# Patient Record
Sex: FEMALE | Race: WHITE | NOT HISPANIC OR LATINO | Employment: OTHER | URBAN - METROPOLITAN AREA
[De-identification: names, ages, dates, MRNs, and addresses within clinical notes are randomized per-mention and may not be internally consistent; named-entity substitution may affect disease eponyms.]

---

## 2017-11-08 ENCOUNTER — OFFICE VISIT (OUTPATIENT)
Dept: OBSTETRICS AND GYNECOLOGY | Facility: CLINIC | Age: 62
End: 2017-11-08
Payer: COMMERCIAL

## 2017-11-08 VITALS
DIASTOLIC BLOOD PRESSURE: 66 MMHG | BODY MASS INDEX: 24.06 KG/M2 | HEIGHT: 67 IN | WEIGHT: 153.31 LBS | SYSTOLIC BLOOD PRESSURE: 120 MMHG

## 2017-11-08 DIAGNOSIS — N89.8 VAGINAL DRYNESS: ICD-10-CM

## 2017-11-08 DIAGNOSIS — Z12.11 SCREEN FOR COLON CANCER: ICD-10-CM

## 2017-11-08 DIAGNOSIS — G47.09 OTHER INSOMNIA: Primary | ICD-10-CM

## 2017-11-08 DIAGNOSIS — Z01.411 ENCOUNTER FOR GYNECOLOGICAL EXAMINATION (GENERAL) (ROUTINE) WITH ABNORMAL FINDINGS: ICD-10-CM

## 2017-11-08 DIAGNOSIS — Z78.0 MENOPAUSE: ICD-10-CM

## 2017-11-08 DIAGNOSIS — M85.80 OSTEOPENIA, UNSPECIFIED LOCATION: ICD-10-CM

## 2017-11-08 DIAGNOSIS — Z11.51 SPECIAL SCREENING EXAMINATION FOR HUMAN PAPILLOMAVIRUS (HPV): ICD-10-CM

## 2017-11-08 DIAGNOSIS — Z12.4 SCREENING FOR CERVICAL CANCER: ICD-10-CM

## 2017-11-08 DIAGNOSIS — Z12.31 VISIT FOR SCREENING MAMMOGRAM: ICD-10-CM

## 2017-11-08 PROCEDURE — 99999 PR PBB SHADOW E&M-EST. PATIENT-LVL IV: CPT | Mod: PBBFAC,,, | Performed by: OBSTETRICS & GYNECOLOGY

## 2017-11-08 PROCEDURE — 99396 PREV VISIT EST AGE 40-64: CPT | Mod: S$GLB,,, | Performed by: OBSTETRICS & GYNECOLOGY

## 2017-11-08 PROCEDURE — 88175 CYTOPATH C/V AUTO FLUID REDO: CPT

## 2017-11-08 PROCEDURE — 87624 HPV HI-RISK TYP POOLED RSLT: CPT

## 2017-11-08 RX ORDER — IBANDRONATE SODIUM 150 MG/1
150 TABLET, FILM COATED ORAL
Qty: 1 TABLET | Refills: 11 | Status: SHIPPED | OUTPATIENT
Start: 2017-11-08 | End: 2018-09-06 | Stop reason: SDUPTHER

## 2017-11-08 RX ORDER — DICYCLOMINE HYDROCHLORIDE 10 MG/1
10 CAPSULE ORAL
COMMUNITY
End: 2017-12-04 | Stop reason: SDUPTHER

## 2017-11-08 RX ORDER — PANTOPRAZOLE SODIUM 40 MG/1
40 TABLET, DELAYED RELEASE ORAL DAILY
COMMUNITY
End: 2017-12-04 | Stop reason: SDUPTHER

## 2017-11-08 RX ORDER — CELECOXIB 200 MG/1
200 CAPSULE ORAL 2 TIMES DAILY
COMMUNITY
End: 2017-12-04 | Stop reason: SDUPTHER

## 2017-11-08 NOTE — PROGRESS NOTES
Subjective:       Patient ID: Amira Araujo is a 62 y.o. female.    Chief Complaint:  Well Woman (last pap and HPV negative 14, last mammogram needed additional imaging breats u/s wnl 16, last DEXA osteopenia- mild T-score Hip: -1.37, Spine: 1.36 14, last colonoscopy wnl , pt c/o insomnia and also states that she has 3 lumps (back of arm and on leg))      History of Present Illness.  Amira Araujo is a 62 y.o. female.  She has no breast or urinary symptoms.  She has no postcoital bleeding, pelvic pain or vaginal discharge.    GYN & OB History  No LMP recorded. Patient is postmenopausal.   Pap:  Normal HPV negative  Mammogram: 16 abnormal additional imaging normal  Colonoscopy: Normal  DEXA:  Mild osteopenia    OB History    Para Term  AB Living   4 2 2   1 3   SAB TAB Ectopic Multiple Live Births   1       3      # Outcome Date GA Lbr Feliz/2nd Weight Sex Delivery Anes PTL Lv   4 Term 83   3.289 kg (7 lb 4 oz) M Vag-Spont   NII   3  79   3.175 kg (7 lb) F Vag-Spont   NII   2 Term 76   3.062 kg (6 lb 12 oz) F Vag-Spont   NII   1 SAB               Obstetric Comments   Age at menarche 10       Past Medical History:   Diagnosis Date    Breast disorder     Ductal carcinoma in situ (DCIS) of right breast 10/2015    - lumpectomy 2015    History of bone density study 2014    osteopenia- mild T-score Hip: -1.37, Spine: 1.36    IBS (irritable bowel syndrome)     Menopause     Osteopenia 2014    Mild      Past Surgical History:   Procedure Laterality Date    BREAST LUMPECTOMY Left 2015    BROW LIFT AND BLEPHAROPLASTY Bilateral 2013    COLONOSCOPY      normal    TUBAL LIGATION  1985    WISDOM TOOTH EXTRACTION       Family History   Problem Relation Age of Onset    Lung cancer Father     Prostate cancer Father 73    Heart attack Father 53    Coronary artery disease Father     Hypertension Father      "Hyperlipidemia Father     Stroke Mother 75    Breast cancer Sister     Colon cancer Neg Hx     Ovarian cancer Neg Hx     Diabetes Neg Hx      Social History   Substance Use Topics    Smoking status: Never Smoker    Smokeless tobacco: Never Used    Alcohol use Yes      Comment: socially        Current Outpatient Prescriptions:     celecoxib (CELEBREX) 200 MG capsule, Take 200 mg by mouth 2 (two) times daily., Disp: , Rfl:     dicyclomine (BENTYL) 10 MG capsule, Take 10 mg by mouth 4 (four) times daily before meals and nightly., Disp: , Rfl:     gabapentin (NEURONTIN) 300 MG capsule, once a day, Disp: , Rfl:     ibandronate (BONIVA) 150 mg tablet, Take 1 tablet (150 mg total) by mouth every 30 days., Disp: 1 tablet, Rfl: 11    pantoprazole (PROTONIX) 40 MG tablet, Take 40 mg by mouth once daily., Disp: , Rfl:     prasterone, dhea, (INTRAROSA) 6.5 mg Inst, Place 6.5 mg vaginally once daily., Disp: 1 each, Rfl: 11    Review of patient's allergies indicates:  No Known Allergies    Review of Systems  Review of Systems   Constitutional: Negative for fatigue.   HENT: Negative for trouble swallowing.    Eyes: Negative for visual disturbance.   Respiratory: Negative for cough and shortness of breath.    Cardiovascular: Negative for chest pain.   Gastrointestinal: Negative for abdominal distention, abdominal pain, blood in stool, nausea and vomiting.   Genitourinary: Negative for difficulty urinating, dyspareunia, dysuria, flank pain, frequency, hematuria, pelvic pain, urgency, vaginal bleeding, vaginal discharge and vaginal pain.   Musculoskeletal: Negative for arthralgias.   Skin: Negative for rash.   Neurological: Negative for dizziness and headaches.   Psychiatric/Behavioral: Negative for sleep disturbance. The patient is not nervous/anxious.         Objective:     Vitals:    11/08/17 1115   BP: 120/66   Weight: 69.6 kg (153 lb 5.3 oz)   Height: 5' 7" (1.702 m)   PainSc: 0-No pain     Body mass index is 24.01 " kg/m².    Physical Exam:   Constitutional: She is oriented to person, place, and time. Vital signs are normal. She appears well-developed and well-nourished.    HENT:   Head: Normocephalic.     Neck: Normal range of motion. No thyromegaly present.     Pulmonary/Chest: Right breast exhibits no mass, no nipple discharge, no skin change, no tenderness and no swelling. Left breast exhibits no mass, no nipple discharge, no skin change, no tenderness and no swelling. Breasts are symmetrical.        Abdominal: Soft. Normal appearance and bowel sounds are normal. She exhibits no distension. There is no tenderness.     Genitourinary: Rectum normal, vagina normal and uterus normal. Rectal exam shows guaiac negative stool. Guaiac negative stool. Pelvic exam was performed with patient supine. There is no rash, tenderness, lesion or injury on the right labia. There is no rash, tenderness, lesion or injury on the left labia. Cervix is normal. Right adnexum displays no mass, no tenderness and no fullness. Left adnexum displays no mass, no tenderness and no fullness. No erythema in the vagina. No vaginal discharge found. Cervix exhibits no motion tenderness and no discharge.           Musculoskeletal: Normal range of motion.      Lymphadenopathy:        Right: No inguinal and no supraclavicular adenopathy present.        Left: No inguinal and no supraclavicular adenopathy present.    Neurological: She is alert and oriented to person, place, and time.    Skin: Skin is warm and dry.    Psychiatric: She has a normal mood and affect.        Assessment/ Plan:     Other insomnia    Osteopenia, unspecified location  -     ibandronate (BONIVA) 150 mg tablet; Take 1 tablet (150 mg total) by mouth every 30 days.  Dispense: 1 tablet; Refill: 11    Encounter for gynecological examination (general) (routine) with abnormal findings    Screen for colon cancer    Menopause  -     DXA Bone Density Spine And Hip; Future; Expected date:  11/08/2017    Visit for screening mammogram  -     Mammo Digital Screening Bilat with Tomosynthesis CAD; Future; Expected date: 11/08/2017    Screening for cervical cancer  -     Liquid-based pap smear, screening    Special screening examination for human papillomavirus (HPV)  -     HPV High Risk Genotypes, PCR    Vaginal dryness  -     prasterone, dhea, (INTRAROSA) 6.5 mg Inst; Place 6.5 mg vaginally once daily.  Dispense: 1 each; Refill: 11      Melatonin 3 mg QHS  Routine pap smears.  Self breast exam and mammography discussed  Routine colonoscopy discussed.  Diet and exercise discussed.  Recommend calcium 1200 mg and vitamin D 600 units daily and routine bone mineral density testing.  Yearly influenza vaccination discussed.  Follow-up with me in prn

## 2017-11-13 LAB
HPV16 AG SPEC QL: NEGATIVE
HPV16+18+H RISK 12 DNA CVX-IMP: NEGATIVE
HPV18 DNA SPEC QL NAA+PROBE: NEGATIVE

## 2017-11-14 ENCOUNTER — TELEPHONE (OUTPATIENT)
Dept: OBSTETRICS AND GYNECOLOGY | Facility: CLINIC | Age: 62
End: 2017-11-14

## 2017-11-14 NOTE — TELEPHONE ENCOUNTER
Informed pt of normal results and recommended to return in a year for next annual exam. Pt verbalized understanding.

## 2017-11-14 NOTE — TELEPHONE ENCOUNTER
----- Message from Tatyana Rodriguez MD sent at 11/14/2017 11:48 AM CST -----  Call patient and tell her that her Pap smear is normal and I will see her in 1 year for her annual exam.

## 2017-12-04 ENCOUNTER — OFFICE VISIT (OUTPATIENT)
Dept: INTERNAL MEDICINE | Facility: CLINIC | Age: 62
End: 2017-12-04
Payer: COMMERCIAL

## 2017-12-04 VITALS
HEIGHT: 67 IN | HEART RATE: 70 BPM | WEIGHT: 154.56 LBS | SYSTOLIC BLOOD PRESSURE: 99 MMHG | BODY MASS INDEX: 24.26 KG/M2 | DIASTOLIC BLOOD PRESSURE: 62 MMHG | TEMPERATURE: 99 F

## 2017-12-04 DIAGNOSIS — Z00.00 ANNUAL PHYSICAL EXAM: Primary | ICD-10-CM

## 2017-12-04 DIAGNOSIS — K21.9 GASTROESOPHAGEAL REFLUX DISEASE WITHOUT ESOPHAGITIS: ICD-10-CM

## 2017-12-04 DIAGNOSIS — G89.29 CHRONIC MIDLINE LOW BACK PAIN WITHOUT SCIATICA: ICD-10-CM

## 2017-12-04 DIAGNOSIS — M54.50 CHRONIC MIDLINE LOW BACK PAIN WITHOUT SCIATICA: ICD-10-CM

## 2017-12-04 DIAGNOSIS — K58.9 IRRITABLE BOWEL SYNDROME, UNSPECIFIED TYPE: ICD-10-CM

## 2017-12-04 PROCEDURE — 99999 PR PBB SHADOW E&M-EST. PATIENT-LVL III: CPT | Mod: PBBFAC,,, | Performed by: INTERNAL MEDICINE

## 2017-12-04 PROCEDURE — 99386 PREV VISIT NEW AGE 40-64: CPT | Mod: S$GLB,,, | Performed by: INTERNAL MEDICINE

## 2017-12-04 RX ORDER — CELECOXIB 200 MG/1
200 CAPSULE ORAL 2 TIMES DAILY PRN
Qty: 60 CAPSULE | Refills: 2 | Status: SHIPPED | OUTPATIENT
Start: 2017-12-04 | End: 2018-09-06 | Stop reason: SDUPTHER

## 2017-12-04 RX ORDER — GABAPENTIN 300 MG/1
300 CAPSULE ORAL 3 TIMES DAILY
Qty: 90 CAPSULE | Refills: 11 | Status: SHIPPED | OUTPATIENT
Start: 2017-12-04 | End: 2018-09-06 | Stop reason: SDUPTHER

## 2017-12-04 RX ORDER — PANTOPRAZOLE SODIUM 40 MG/1
40 TABLET, DELAYED RELEASE ORAL DAILY
Qty: 30 TABLET | Refills: 11 | Status: SHIPPED | OUTPATIENT
Start: 2017-12-04 | End: 2020-03-05

## 2017-12-04 RX ORDER — DICYCLOMINE HYDROCHLORIDE 10 MG/1
10 CAPSULE ORAL
Qty: 90 CAPSULE | Refills: 6 | Status: SHIPPED | OUTPATIENT
Start: 2017-12-04 | End: 2018-09-06 | Stop reason: SDUPTHER

## 2017-12-04 NOTE — PROGRESS NOTES
Subjective:       Patient ID: Amira Araujo is a 62 y.o. female.    Chief Complaint: Annual Exam and Establish Care    HPI    62 y.o. female here for health maintenance exam and follow up of chronic medical conditions: osteopenia, herniated lumbar disc, GERD, IBS     Lipid disorders/ASCVD risk (ages >/= 45 or >/= 20 if increased risk ): due    DM (>45y yearly or if obese, HTN): A1c due  Hepatitis C (one time if born between 8066-2736): due   Sexual Screening: no concerns  STD screening: no concerns  Eye exam: utd 11/2017  Breast Cancer (40-50y discretion of pt, 50-74y every 1-2 years): Mammogram 11/30/2017  performed at [facility] ([facility])  Cervical Cancer (Pap Smear ages 21-65 every 3 years or Pap + HPV q5 years after 30 years of age):  11/8/2017 - normal  Colorectal Cancer (normal risk 50-75yr): Colonoscopy - Dr. Horn  - 2014- normal  DEXA (F>64 yo, M >69yo, M&F 50-68 yo with risk factors (smoking, previous fx, wt <70kg; etoh abuse, chronic steroids, RA)): 11/30/2017 [facility]        Vaccines:   Influenza (yearly) utd   Tetanus (every 10 yrs - 1st tdap) 3 years ago    PPSV23(>66yo or <65 w/ lung dz, smoking, DM) Unsure which pneumonia shot- 2 years ago - she will find out.  Zoster (>61yo) utd 6 years ago     Exercise: [facility] 4x/wk - bike, walking, weight  Diet: in general healthy     Ortho- Dr. Ector Oropeza w/ [facility] for herniated lumbar disc- takes gabapentin and celebrex if acting up- mainly if playing w/ or lifting grandchildren.  Cardiologist - Dr. Reyes -abnl EKG- negative w/u told everything was normal.  IBS and GERD - GI- Dr. Jose Zhao. IBS- takes bentyl before meal if she is going out to eat.   Problem sleeping- gyn told her to try melatonin- she lost paper so hasn't tried yet.  Osteopenia- taking boniva 150mg q30 days- gynecologist recently ordered another DEXA- results pending  Past Medical History:   Diagnosis Date    Breast disorder      Ductal carcinoma in situ (DCIS) of right breast 10/2015    - lumpectomy 11/2015    History of bone density study 08/20/2014    osteopenia- mild T-score Hip: -1.37, Spine: 1.36    IBS (irritable bowel syndrome)     Menopause 2001    Osteopenia 08/20/2014    Mild      Past Surgical History:   Procedure Laterality Date    BREAST LUMPECTOMY Left 11/2015    BROW LIFT AND BLEPHAROPLASTY Bilateral 2013    COLONOSCOPY  2014    normal    TUBAL LIGATION  07/04/1985    WISDOM TOOTH EXTRACTION       Family History   Problem Relation Age of Onset    Lung cancer Father     Prostate cancer Father 73    Heart attack Father 53    Coronary artery disease Father     Hypertension Father     Hyperlipidemia Father     Stroke Mother 75    Breast cancer Sister     Colon cancer Neg Hx     Ovarian cancer Neg Hx     Diabetes Neg Hx      Social History     Social History    Marital status:      Spouse name: N/A    Number of children: N/A    Years of education: N/A     Occupational History    Not on file.     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      Review of patient's allergies indicates:  No Known Allergies    Current Outpatient Prescriptions:     celecoxib (CELEBREX) 200 MG capsule, Take 1 capsule (200 mg total) by mouth 2 (two) times daily as needed for Pain. Take with food, Disp: 60 capsule, Rfl: 2    gabapentin (NEURONTIN) 300 MG capsule, Take 1 capsule (300 mg total) by mouth 3 (three) times daily., Disp: 90 capsule, Rfl: 11    ibandronate (BONIVA) 150 mg tablet, Take 1 tablet (150 mg total) by mouth every 30 days., Disp: 1  "tablet, Rfl: 11    pantoprazole (PROTONIX) 40 MG tablet, Take 1 tablet (40 mg total) by mouth once daily., Disp: 30 tablet, Rfl: 11    prasterone, dhea, (INTRAROSA) 6.5 mg Inst, Place 6.5 mg vaginally once daily., Disp: 1 each, Rfl: 11    dicyclomine (BENTYL) 10 MG capsule, Take 1 capsule (10 mg total) by mouth before meals as needed., Disp: 90 capsule, Rfl: 6        Review of Systems   Constitutional: Positive for fatigue. Negative for unexpected weight change.   HENT: Negative for dental problem, sinus pain and trouble swallowing.    Eyes: Negative for discharge and redness.   Respiratory: Negative for cough and shortness of breath.    Cardiovascular: Negative for chest pain and leg swelling.   Gastrointestinal: Positive for abdominal pain. Negative for blood in stool, constipation and diarrhea.   Genitourinary: Negative for difficulty urinating, dysuria and frequency.   Musculoskeletal: Positive for back pain. Negative for gait problem and joint swelling.   Skin: Negative for pallor, rash and wound.   Neurological: Negative for numbness and headaches.   Psychiatric/Behavioral: Positive for sleep disturbance.       Objective:        Vitals:    12/04/17 1307   BP: 99/62   BP Location: Right arm   Patient Position: Sitting   BP Method: Medium (Automatic)   Pulse: 70   Temp: 98.6 °F (37 °C)   TempSrc: Oral   Weight: 70.1 kg (154 lb 8.7 oz)   Height: 5' 7" (1.702 m)       Body mass index is 24.2 kg/m².    Physical Exam   Constitutional: She is oriented to person, place, and time. She appears well-developed. No distress.   HENT:   Head: Normocephalic and atraumatic.   Right Ear: Tympanic membrane normal.   Left Ear: Tympanic membrane normal.   Nose: Nose normal.   Mouth/Throat: Oropharynx is clear and moist.   Eyes: Conjunctivae and EOM are normal. Pupils are equal, round, and reactive to light.   Neck: Normal range of motion. Neck supple. No tracheal deviation present. No thyromegaly present.   Cardiovascular: " Normal rate, regular rhythm, normal heart sounds and intact distal pulses.    Pulmonary/Chest: Effort normal and breath sounds normal. No respiratory distress.   Abdominal: Soft. Bowel sounds are normal. She exhibits no distension and no mass. There is no tenderness.   Musculoskeletal: Normal range of motion. She exhibits no edema.   Lymphadenopathy:     She has no cervical adenopathy.   Neurological: She is alert and oriented to person, place, and time. No sensory deficit.   Skin: Skin is warm and dry. She is not diaphoretic. No cyanosis. Nails show no clubbing.   Psychiatric: She has a normal mood and affect. Her behavior is normal. Judgment normal.       Assessment:     1. Annual physical exam    2. Chronic midline low back pain without sciatica    3. Gastroesophageal reflux disease without esophagitis    4. Irritable bowel syndrome, unspecified type           Plan:         1. Annual physical exam  - utd on immunizations. Will let us know which pneumonia vaccine she received  - will request records of colonoscopy  - will request mammo and BMD from [facility] results be faxed to me  - CBC auto differential; Future  - Comprehensive metabolic panel; Future  - Urinalysis; Future  - TSH; Future  - Lipid panel; Future  - Hemoglobin A1c; Future  - Hepatitis C antibody; Future  - Vitamin D; Future      2. Chronic midline low back pain without sciatica  - celecoxib (CELEBREX) 200 MG capsule; Take 1 capsule (200 mg total) by mouth 2 (two) times daily as needed for Pain. Take with food  Dispense: 60 capsule; Refill: 2  - gabapentin (NEURONTIN) 300 MG capsule; Take 1 capsule (300 mg total) by mouth 3 (three) times daily.  Dispense: 90 capsule; Refill: 11    3. Gastroesophageal reflux disease without esophagitis  - pantoprazole (PROTONIX) 40 MG tablet; Take 1 tablet (40 mg total) by mouth once daily.  Dispense: 30 tablet; Refill: 11    4. Irritable bowel syndrome, unspecified type  - dicyclomine (BENTYL) 10 MG capsule; Take 1  capsule (10 mg total) by mouth before meals as needed.  Dispense: 90 capsule; Refill: 6

## 2017-12-06 ENCOUNTER — PATIENT MESSAGE (OUTPATIENT)
Dept: INTERNAL MEDICINE | Facility: CLINIC | Age: 62
End: 2017-12-06

## 2017-12-06 ENCOUNTER — LAB VISIT (OUTPATIENT)
Dept: LAB | Facility: HOSPITAL | Age: 62
End: 2017-12-06
Attending: INTERNAL MEDICINE
Payer: COMMERCIAL

## 2017-12-06 DIAGNOSIS — Z00.00 ANNUAL PHYSICAL EXAM: ICD-10-CM

## 2017-12-06 LAB
25(OH)D3+25(OH)D2 SERPL-MCNC: 27 NG/ML
ALBUMIN SERPL BCP-MCNC: 3.6 G/DL
ALP SERPL-CCNC: 53 U/L
ALT SERPL W/O P-5'-P-CCNC: 23 U/L
ANION GAP SERPL CALC-SCNC: 5 MMOL/L
AST SERPL-CCNC: 18 U/L
BASOPHILS # BLD AUTO: 0.05 K/UL
BASOPHILS NFR BLD: 1 %
BILIRUB SERPL-MCNC: 0.6 MG/DL
BUN SERPL-MCNC: 18 MG/DL
CALCIUM SERPL-MCNC: 9.2 MG/DL
CHLORIDE SERPL-SCNC: 107 MMOL/L
CHOLEST SERPL-MCNC: 192 MG/DL
CHOLEST/HDLC SERPL: 2.9 {RATIO}
CO2 SERPL-SCNC: 29 MMOL/L
CREAT SERPL-MCNC: 0.7 MG/DL
DIFFERENTIAL METHOD: ABNORMAL
EOSINOPHIL # BLD AUTO: 0.1 K/UL
EOSINOPHIL NFR BLD: 1.9 %
ERYTHROCYTE [DISTWIDTH] IN BLOOD BY AUTOMATED COUNT: 12.5 %
EST. GFR  (AFRICAN AMERICAN): >60 ML/MIN/1.73 M^2
EST. GFR  (NON AFRICAN AMERICAN): >60 ML/MIN/1.73 M^2
ESTIMATED AVG GLUCOSE: 103 MG/DL
GLUCOSE SERPL-MCNC: 91 MG/DL
HBA1C MFR BLD HPLC: 5.2 %
HCT VFR BLD AUTO: 41.6 %
HCV AB SERPL QL IA: NEGATIVE
HDLC SERPL-MCNC: 67 MG/DL
HDLC SERPL: 34.9 %
HGB BLD-MCNC: 13.2 G/DL
IMM GRANULOCYTES # BLD AUTO: 0.02 K/UL
IMM GRANULOCYTES NFR BLD AUTO: 0.4 %
LDLC SERPL CALC-MCNC: 110.8 MG/DL
LYMPHOCYTES # BLD AUTO: 2 K/UL
LYMPHOCYTES NFR BLD: 38.6 %
MCH RBC QN AUTO: 29.7 PG
MCHC RBC AUTO-ENTMCNC: 31.7 G/DL
MCV RBC AUTO: 94 FL
MONOCYTES # BLD AUTO: 0.4 K/UL
MONOCYTES NFR BLD: 8 %
NEUTROPHILS # BLD AUTO: 2.6 K/UL
NEUTROPHILS NFR BLD: 50.1 %
NONHDLC SERPL-MCNC: 125 MG/DL
NRBC BLD-RTO: 0 /100 WBC
PLATELET # BLD AUTO: 278 K/UL
PMV BLD AUTO: 10.5 FL
POTASSIUM SERPL-SCNC: 4 MMOL/L
PROT SERPL-MCNC: 6.8 G/DL
RBC # BLD AUTO: 4.44 M/UL
SODIUM SERPL-SCNC: 141 MMOL/L
TRIGL SERPL-MCNC: 71 MG/DL
TSH SERPL DL<=0.005 MIU/L-ACNC: 0.89 UIU/ML
WBC # BLD AUTO: 5.26 K/UL

## 2017-12-06 PROCEDURE — 80053 COMPREHEN METABOLIC PANEL: CPT

## 2017-12-06 PROCEDURE — 82306 VITAMIN D 25 HYDROXY: CPT

## 2017-12-06 PROCEDURE — 84443 ASSAY THYROID STIM HORMONE: CPT

## 2017-12-06 PROCEDURE — 85025 COMPLETE CBC W/AUTO DIFF WBC: CPT

## 2017-12-06 PROCEDURE — 36415 COLL VENOUS BLD VENIPUNCTURE: CPT | Mod: PO

## 2017-12-06 PROCEDURE — 83036 HEMOGLOBIN GLYCOSYLATED A1C: CPT

## 2017-12-06 PROCEDURE — 80061 LIPID PANEL: CPT

## 2017-12-06 PROCEDURE — 86803 HEPATITIS C AB TEST: CPT

## 2017-12-08 ENCOUNTER — TELEPHONE (OUTPATIENT)
Dept: OBSTETRICS AND GYNECOLOGY | Facility: CLINIC | Age: 62
End: 2017-12-08

## 2018-06-13 ENCOUNTER — OFFICE VISIT (OUTPATIENT)
Dept: DERMATOLOGY | Facility: CLINIC | Age: 63
End: 2018-06-13
Payer: MEDICARE

## 2018-06-13 DIAGNOSIS — L81.4 LENTIGO: Primary | ICD-10-CM

## 2018-06-13 DIAGNOSIS — L82.1 SK (SEBORRHEIC KERATOSIS): ICD-10-CM

## 2018-06-13 DIAGNOSIS — D18.00 ANGIOMA: ICD-10-CM

## 2018-06-13 DIAGNOSIS — D22.9 NEVUS: ICD-10-CM

## 2018-06-13 PROCEDURE — 99203 OFFICE O/P NEW LOW 30 MIN: CPT | Mod: S$PBB,,, | Performed by: DERMATOLOGY

## 2018-06-13 PROCEDURE — 99212 OFFICE O/P EST SF 10 MIN: CPT | Mod: PBBFAC,PO | Performed by: DERMATOLOGY

## 2018-06-13 PROCEDURE — 99999 PR PBB SHADOW E&M-EST. PATIENT-LVL II: CPT | Mod: PBBFAC,,, | Performed by: DERMATOLOGY

## 2018-06-13 RX ORDER — HYDROQUINONE 40 MG/G
CREAM TOPICAL
Qty: 46 G | Refills: 1 | Status: SHIPPED | OUTPATIENT
Start: 2018-06-13 | End: 2020-03-05

## 2018-06-13 NOTE — PROGRESS NOTES
Subjective:       Patient ID:  Amira Araujo is a 62 y.o. female who presents for   Chief Complaint   Patient presents with    Skin Check    Lesion     HPI    Review of Systems     Objective:    Physical Exam       Diagram Legend     Erythematous scaling macule/papule c/w actinic keratosis       Vascular papule c/w angioma      Pigmented verrucoid papule/plaque c/w seborrheic keratosis      Yellow umbilicated papule c/w sebaceous hyperplasia      Irregularly shaped tan macule c/w lentigo     1-2 mm smooth white papules consistent with Milia      Movable subcutaneous cyst with punctum c/w epidermal inclusion cyst      Subcutaneous movable cyst c/w pilar cyst      Firm pink to brown papule c/w dermatofibroma      Pedunculated fleshy papule(s) c/w skin tag(s)      Evenly pigmented macule c/w junctional nevus     Mildly variegated pigmented, slightly irregular-bordered macule c/w mildly atypical nevus      Flesh colored to evenly pigmented papule c/w intradermal nevus       Pink pearly papule/plaque c/w basal cell carcinoma      Erythematous hyperkeratotic cursted plaque c/w SCC      Surgical scar with no sign of skin cancer recurrence      Open and closed comedones      Inflammatory papules and pustules      Verrucoid papule consistent consistent with wart     Erythematous eczematous patches and plaques     Dystrophic onycholytic nail with subungual debris c/w onychomycosis     Umbilicated papule    Erythematous-base heme-crusted tan verrucoid plaque consistent with inflamed seborrheic keratosis     Erythematous Silvery Scaling Plaque c/w Psoriasis     See annotation      Assessment / Plan:        There are no diagnoses linked to this encounter.         No Follow-up on file.

## 2018-06-13 NOTE — PROGRESS NOTES
"  Subjective:       Patient ID:  Amira Araujo is a 62 y.o. female who presents for   Chief Complaint   Patient presents with    Skin Check    Lesion     New patient presents for TBSE.  No personal history of skin cancer. 1 mole removed from back, benign. No lesions of concern for skin cancer. Reports getting "age spots" on her face.     Patient is here today for a "mole" check.   Pt has a history of  extensive sun exposure in the past.   Pt recalls several blistering sunburns in the past- few   Pt has history of tanning bed use- once  Pt has  had moles removed in the past- one on back   Pt has history of melanoma in first degree relatives-  no          Review of Systems   Skin: Positive for daily sunscreen use and activity-related sunscreen use. Negative for tendency to form keloidal scars.   Hematologic/Lymphatic: Does not bruise/bleed easily.        Objective:    Physical Exam   Constitutional: She appears well-developed and well-nourished. No distress.   Neurological: She is alert and oriented to person, place, and time. She is not disoriented.   Psychiatric: She has a normal mood and affect.   Skin:   Areas Examined (abnormalities noted in diagram):   Scalp / Hair Palpated and Inspected  Head / Face Inspection Performed  Neck Inspection Performed  Chest / Axilla Inspection Performed  Abdomen Inspection Performed  Genitals / Buttocks / Groin Inspection Performed  Back Inspection Performed  RUE Inspected  LUE Inspection Performed  RLE Inspected  LLE Inspection Performed  Nails and Digits Inspection Performed                   Diagram Legend     Erythematous scaling macule/papule c/w actinic keratosis       Vascular papule c/w angioma      Pigmented verrucoid papule/plaque c/w seborrheic keratosis      Yellow umbilicated papule c/w sebaceous hyperplasia      Irregularly shaped tan macule c/w lentigo     1-2 mm smooth white papules consistent with Milia      Movable subcutaneous cyst with punctum c/w " epidermal inclusion cyst      Subcutaneous movable cyst c/w pilar cyst      Firm pink to brown papule c/w dermatofibroma      Pedunculated fleshy papule(s) c/w skin tag(s)      Evenly pigmented macule c/w junctional nevus     Mildly variegated pigmented, slightly irregular-bordered macule c/w mildly atypical nevus      Flesh colored to evenly pigmented papule c/w intradermal nevus       Pink pearly papule/plaque c/w basal cell carcinoma      Erythematous hyperkeratotic cursted plaque c/w SCC      Surgical scar with no sign of skin cancer recurrence      Open and closed comedones      Inflammatory papules and pustules      Verrucoid papule consistent consistent with wart     Erythematous eczematous patches and plaques     Dystrophic onycholytic nail with subungual debris c/w onychomycosis     Umbilicated papule    Erythematous-base heme-crusted tan verrucoid plaque consistent with inflamed seborrheic keratosis     Erythematous Silvery Scaling Plaque c/w Psoriasis     See annotation      Assessment / Plan:        Lentigo  This is a benign hyperpigmented sun induced lesion. Daily sun protection will reduce the number of new lesions. Treatment of these benign lesions are considered cosmetic.  The nature of sun-induced photo-aging and skin cancers is discussed.  Sun avoidance, protective clothing, and the use of 30-SPF sunscreens is advised. Observe closely for skin damage/changes, and call if such occurs.  OTC retinol all over then below on dark areas  -     hydroquinone 4 % Crea; Apply to dark areas qhs.  Not more than 6 months straight in same location.Use sunscreen in am  Dispense: 46 g; Refill: 1    Angioma  These are benign vascular lesions that are inherited.  Treatment is not necessary.    Nevus  Discussed ABCDE's of nevi.  Monitor for new mole or moles that are becoming bigger, darker, irritated, or developing irregular borders. Brochure provided.    SK (seborrheic keratosis)  These are benign inherited growths  without a malignant potential. Reassurance given to patient. No treatment is necessary.     Total body skin examination performed today including at least 12 points as noted in physical examination. No lesions suspicious for malignancy noted.             Follow-up in about 1 year (around 6/13/2019).

## 2018-09-05 ENCOUNTER — OFFICE VISIT (OUTPATIENT)
Dept: DERMATOLOGY | Facility: CLINIC | Age: 63
End: 2018-09-05
Payer: MEDICARE

## 2018-09-05 DIAGNOSIS — L72.0 MILIA: Primary | ICD-10-CM

## 2018-09-05 DIAGNOSIS — L81.9 DYSCHROMIA: ICD-10-CM

## 2018-09-05 PROCEDURE — 99212 OFFICE O/P EST SF 10 MIN: CPT | Mod: PBBFAC,PO | Performed by: DERMATOLOGY

## 2018-09-05 PROCEDURE — 99213 OFFICE O/P EST LOW 20 MIN: CPT | Mod: S$PBB,,, | Performed by: DERMATOLOGY

## 2018-09-05 PROCEDURE — 99999 PR PBB SHADOW E&M-EST. PATIENT-LVL II: CPT | Mod: PBBFAC,,, | Performed by: DERMATOLOGY

## 2018-09-05 RX ORDER — TRETINOIN 0.25 MG/G
CREAM TOPICAL
Qty: 45 G | Refills: 6 | Status: SHIPPED | OUTPATIENT
Start: 2018-09-05 | End: 2020-03-05

## 2018-09-05 NOTE — PROGRESS NOTES
Subjective:       Patient ID:  Amira Araujo is a 63 y.o. female who presents for   Chief Complaint   Patient presents with    Lesion     Py c/o whites bumps on face x 1 year. Gets worst with heat. No prev tx.  Also complains of dark circles under her eyes.  No tx currently.   Using retinol otc .         Review of Systems   Skin: Positive for daily sunscreen use. Negative for tendency to form keloidal scars.   Hematologic/Lymphatic: Does not bruise/bleed easily.        Objective:    Physical Exam   Constitutional: She appears well-developed and well-nourished. No distress.   Neurological: She is alert and oriented to person, place, and time. She is not disoriented.   Psychiatric: She has a normal mood and affect.   Skin:   Areas Examined (abnormalities noted in diagram):   Head / Face Inspection Performed              Diagram Legend     Erythematous scaling macule/papule c/w actinic keratosis       Vascular papule c/w angioma      Pigmented verrucoid papule/plaque c/w seborrheic keratosis      Yellow umbilicated papule c/w sebaceous hyperplasia      Irregularly shaped tan macule c/w lentigo     1-2 mm smooth white papules consistent with Milia      Movable subcutaneous cyst with punctum c/w epidermal inclusion cyst      Subcutaneous movable cyst c/w pilar cyst      Firm pink to brown papule c/w dermatofibroma      Pedunculated fleshy papule(s) c/w skin tag(s)      Evenly pigmented macule c/w junctional nevus     Mildly variegated pigmented, slightly irregular-bordered macule c/w mildly atypical nevus      Flesh colored to evenly pigmented papule c/w intradermal nevus       Pink pearly papule/plaque c/w basal cell carcinoma      Erythematous hyperkeratotic cursted plaque c/w SCC      Surgical scar with no sign of skin cancer recurrence      Open and closed comedones      Inflammatory papules and pustules      Verrucoid papule consistent consistent with wart     Erythematous eczematous patches and plaques      Dystrophic onycholytic nail with subungual debris c/w onychomycosis     Umbilicated papule    Erythematous-base heme-crusted tan verrucoid plaque consistent with inflamed seborrheic keratosis     Erythematous Silvery Scaling Plaque c/w Psoriasis     See annotation      Assessment / Plan:        Milia  -     tretinoin (RETIN-A) 0.025 % cream; Apply thin film to face qhs then moisturize  Dispense: 45 g; Refill: 6 or [facility] retinol  Reassurance    Dyschromia  Teamine bid            Follow-up if symptoms worsen or fail to improve.

## 2018-09-06 ENCOUNTER — LAB VISIT (OUTPATIENT)
Dept: LAB | Facility: HOSPITAL | Age: 63
End: 2018-09-06
Attending: INTERNAL MEDICINE
Payer: MEDICARE

## 2018-09-06 ENCOUNTER — OFFICE VISIT (OUTPATIENT)
Dept: INTERNAL MEDICINE | Facility: CLINIC | Age: 63
End: 2018-09-06
Payer: MEDICARE

## 2018-09-06 VITALS
HEIGHT: 67 IN | TEMPERATURE: 99 F | DIASTOLIC BLOOD PRESSURE: 66 MMHG | SYSTOLIC BLOOD PRESSURE: 90 MMHG | HEART RATE: 80 BPM | WEIGHT: 168 LBS | BODY MASS INDEX: 26.37 KG/M2

## 2018-09-06 DIAGNOSIS — M54.50 CHRONIC MIDLINE LOW BACK PAIN WITHOUT SCIATICA: ICD-10-CM

## 2018-09-06 DIAGNOSIS — M25.561 CHRONIC PAIN OF RIGHT KNEE: ICD-10-CM

## 2018-09-06 DIAGNOSIS — Z23 NEED FOR VACCINATION WITH 13-POLYVALENT PNEUMOCOCCAL CONJUGATE VACCINE: ICD-10-CM

## 2018-09-06 DIAGNOSIS — Z12.31 VISIT FOR SCREENING MAMMOGRAM: ICD-10-CM

## 2018-09-06 DIAGNOSIS — K58.9 IRRITABLE BOWEL SYNDROME, UNSPECIFIED TYPE: ICD-10-CM

## 2018-09-06 DIAGNOSIS — R53.83 FATIGUE, UNSPECIFIED TYPE: ICD-10-CM

## 2018-09-06 DIAGNOSIS — E55.9 VITAMIN D INSUFFICIENCY: ICD-10-CM

## 2018-09-06 DIAGNOSIS — R26.9 GAIT ABNORMALITY: ICD-10-CM

## 2018-09-06 DIAGNOSIS — R26.81 UNSTEADINESS ON FEET: ICD-10-CM

## 2018-09-06 DIAGNOSIS — G89.29 CHRONIC MIDLINE LOW BACK PAIN WITHOUT SCIATICA: ICD-10-CM

## 2018-09-06 DIAGNOSIS — G89.29 CHRONIC PAIN OF RIGHT KNEE: ICD-10-CM

## 2018-09-06 DIAGNOSIS — K21.9 GASTROESOPHAGEAL REFLUX DISEASE WITHOUT ESOPHAGITIS: ICD-10-CM

## 2018-09-06 DIAGNOSIS — Z78.0 ASYMPTOMATIC POSTMENOPAUSAL STATE: ICD-10-CM

## 2018-09-06 DIAGNOSIS — Z23 NEED FOR SHINGLES VACCINE: ICD-10-CM

## 2018-09-06 DIAGNOSIS — M85.80 OSTEOPENIA, UNSPECIFIED LOCATION: ICD-10-CM

## 2018-09-06 DIAGNOSIS — M85.80 OSTEOPENIA, UNSPECIFIED LOCATION: Primary | ICD-10-CM

## 2018-09-06 LAB
25(OH)D3+25(OH)D2 SERPL-MCNC: 28 NG/ML
IRON SERPL-MCNC: 149 UG/DL
SATURATED IRON: 37 %
TOTAL IRON BINDING CAPACITY: 401 UG/DL
TRANSFERRIN SERPL-MCNC: 271 MG/DL
TSH SERPL DL<=0.005 MIU/L-ACNC: 0.6 UIU/ML
VIT B12 SERPL-MCNC: 502 PG/ML

## 2018-09-06 PROCEDURE — 84443 ASSAY THYROID STIM HORMONE: CPT

## 2018-09-06 PROCEDURE — 99215 OFFICE O/P EST HI 40 MIN: CPT | Mod: PBBFAC,PO | Performed by: INTERNAL MEDICINE

## 2018-09-06 PROCEDURE — 83540 ASSAY OF IRON: CPT

## 2018-09-06 PROCEDURE — 82607 VITAMIN B-12: CPT

## 2018-09-06 PROCEDURE — 82306 VITAMIN D 25 HYDROXY: CPT

## 2018-09-06 PROCEDURE — 99215 OFFICE O/P EST HI 40 MIN: CPT | Mod: S$PBB,,, | Performed by: INTERNAL MEDICINE

## 2018-09-06 PROCEDURE — G0009 ADMIN PNEUMOCOCCAL VACCINE: HCPCS | Mod: PBBFAC,PO

## 2018-09-06 PROCEDURE — 99999 PR PBB SHADOW E&M-EST. PATIENT-LVL V: CPT | Mod: PBBFAC,,, | Performed by: INTERNAL MEDICINE

## 2018-09-06 PROCEDURE — 36415 COLL VENOUS BLD VENIPUNCTURE: CPT | Mod: PO

## 2018-09-06 RX ORDER — DICYCLOMINE HYDROCHLORIDE 10 MG/1
10 CAPSULE ORAL
Qty: 90 CAPSULE | Refills: 6 | Status: SHIPPED | OUTPATIENT
Start: 2018-09-06 | End: 2020-03-05 | Stop reason: SDUPTHER

## 2018-09-06 RX ORDER — IBANDRONATE SODIUM 150 MG/1
150 TABLET, FILM COATED ORAL
Qty: 3 TABLET | Refills: 3 | Status: SHIPPED | OUTPATIENT
Start: 2018-09-06 | End: 2018-12-07 | Stop reason: ALTCHOICE

## 2018-09-06 RX ORDER — CELECOXIB 200 MG/1
200 CAPSULE ORAL 2 TIMES DAILY PRN
Qty: 60 CAPSULE | Refills: 2 | Status: SHIPPED | OUTPATIENT
Start: 2018-09-06 | End: 2020-09-29

## 2018-09-06 RX ORDER — GABAPENTIN 300 MG/1
300 CAPSULE ORAL NIGHTLY
Qty: 90 CAPSULE | Refills: 3 | Status: SHIPPED | OUTPATIENT
Start: 2018-09-06 | End: 2021-01-20

## 2018-09-06 NOTE — PROGRESS NOTES
Subjective:       Patient ID: Amira Araujo is a 63 y.o. female.    Chief Complaint: Fatigue    HPI    63 y.o. female here to discuss healthcare maintenance and f/u of chronic medical conditions.      Health Maintenance:   Lipid disorders/ASCVD risk: utd    DM: FBG utd  Hepatitis C (2062-8220): negative   Breast Cancer (40-74y): Mammogram due   Cervical Cancer (21-65y):  declines  Colorectal Cancer (50-75yr): Colonoscopy 2014   Bone Density (F>64 yo, M >71yo): DEXA due      Vaccines:   Influenza (yearly) pt will schedule   Tetanus (every 10 yrs - 1st tdap) 2013    PPSV23(>66yo or <65 w/ lung dz, smoking, DM) next year   PCV13 (> 65 or <65 w/ immunocompromised) due   Zoster (>61yo) due       Medical Problems:  1.  osteopenia:  Needs refills of Boniva.  2.   Back pain:  Needs refills of gabapentin and Celebrex    Fatigue started a few months ago. No known inciting events. She does endorse recent falls- twice in June and once in may. She tripped two times, the other time she was on a bike and didn't take a turn sharp enough around the corner. She thinks her falls are due to walking abnormally (leaning forward) due to back pain. She denies head injury or LOC. She goes to bed around 10-10:30 pm and waking up around 7am. She does report feeling rested when she wakes up. It is more so a fatigue by the end of the day.     Past Medical History:   Diagnosis Date    Breast disorder     Ductal carcinoma in situ (DCIS) of right breast 10/2015    - lumpectomy 11/2015    History of bone density study 08/20/2014    osteopenia- mild T-score Hip: -1.37, Spine: 1.36    IBS (irritable bowel syndrome)     Menopause 2001    Osteopenia 08/20/2014    Mild      Past Surgical History:   Procedure Laterality Date    BREAST LUMPECTOMY Left 11/2015    BROW LIFT AND BLEPHAROPLASTY Bilateral 2013    COLONOSCOPY  2014    5mm sessile polyp in sigmoid colon (pathology - no tissue present); diverticulosis, internal hemorrhoids    TUBAL  LIGATION  07/04/1985    WISDOM TOOTH EXTRACTION       Family History   Problem Relation Age of Onset    Lung cancer Father     Prostate cancer Father 73    Heart attack Father 53    Coronary artery disease Father     Hypertension Father     Hyperlipidemia Father     Stroke Mother 75    Breast cancer Sister     Colon cancer Neg Hx     Ovarian cancer Neg Hx     Diabetes Neg Hx     Melanoma Neg Hx      Social History     Socioeconomic History    Marital status:      Spouse name: Not on file    Number of children: Not on file    Years of education: Not on file    Highest education level: Not on file   Social Needs    Financial resource strain: Not on file    Food insecurity - worry: Not on file    Food insecurity - inability: Not on file    Transportation needs - medical: Not on file    Transportation needs - non-medical: Not on file   Occupational History    Not on file   Tobacco Use    Smoking status: Never Smoker    Smokeless tobacco: Never Used   Substance and Sexual Activity    Alcohol use: Yes     Comment: socially     Drug use: No    Sexual activity: Yes     Partners: Male     Birth control/protection: Post-menopausal     Comment:     Other Topics Concern    Are you pregnant or think you may be? 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      Review of patient's allergies indicates:  No Known Allergies    Current Outpatient Medications:     celecoxib (CELEBREX) 200 MG capsule, Take 1 capsule (200 mg total) by mouth 2 (two) times daily as needed for Pain. Take with food, Disp: 60 capsule, Rfl: 2    dicyclomine (BENTYL) 10 MG capsule, Take 1 capsule (10 mg total) by mouth before meals as needed., Disp: 90 capsule, Rfl: 6    gabapentin (NEURONTIN) 300 MG capsule, Take 1 capsule (300 mg total) by mouth every evening., Disp: 90 capsule, Rfl: 3    hydroquinone 4 % Crea,  "Apply to dark areas qhs.  Not more than 6 months straight in same location.Use sunscreen in am, Disp: 46 g, Rfl: 1    ibandronate (BONIVA) 150 mg tablet, Take 1 tablet (150 mg total) by mouth every 30 days., Disp: 3 tablet, Rfl: 3    pantoprazole (PROTONIX) 40 MG tablet, Take 1 tablet (40 mg total) by mouth once daily., Disp: 30 tablet, Rfl: 11    tretinoin (RETIN-A) 0.025 % cream, Apply thin film to face qhs then moisturize, Disp: 45 g, Rfl: 6    varicella-zoster gE-AS01B, PF, (SHINGRIX, PF,) 50 mcg/0.5 mL injection, Inject 0.5 mLs into the muscle once. for 1 dose, Disp: 0.5 mL, Rfl: 1        Review of Systems   Constitutional: Positive for fatigue. Negative for unexpected weight change.   HENT: Negative for dental problem, sinus pain and trouble swallowing.    Eyes: Negative for discharge and redness.   Respiratory: Negative for cough and shortness of breath.    Cardiovascular: Negative for chest pain and leg swelling.   Gastrointestinal: Negative for abdominal pain, blood in stool, constipation and diarrhea.   Genitourinary: Negative for difficulty urinating and dysuria.   Musculoskeletal: Positive for arthralgias, back pain and gait problem. Negative for joint swelling.   Skin: Negative for pallor, rash and wound.   Neurological: Negative for syncope, numbness and headaches.       Objective:        Vitals:    09/06/18 1031   BP: 90/66   BP Location: Right arm   Patient Position: Sitting   BP Method: Medium (Manual)   Pulse: 80   Temp: 98.5 °F (36.9 °C)   TempSrc: Oral   Weight: 76.2 kg (167 lb 15.9 oz)   Height: 5' 7" (1.702 m)       Body mass index is 26.31 kg/m².    Physical Exam   Constitutional: She is oriented to person, place, and time. She appears well-developed and well-nourished. No distress.   HENT:   Head: Normocephalic and atraumatic.   Right Ear: External ear normal.   Left Ear: External ear normal.   Nose: Nose normal.   Mouth/Throat: Oropharynx is clear and moist.   Eyes: Conjunctivae and EOM " are normal. Right eye exhibits no discharge. Left eye exhibits no discharge.   Neck: Normal range of motion. Neck supple.   Cardiovascular: Normal rate, regular rhythm, normal heart sounds and intact distal pulses.   Pulmonary/Chest: Effort normal and breath sounds normal.   Abdominal: Soft. Bowel sounds are normal.   Musculoskeletal: She exhibits no edema.        Lumbar back: She exhibits decreased range of motion and pain.   Lymphadenopathy:     She has no cervical adenopathy.   Neurological: She is alert and oriented to person, place, and time. She has normal strength. Gait (leaning forward above hips) abnormal.   Skin: Skin is warm and dry. She is not diaphoretic. No erythema.   Psychiatric: She has a normal mood and affect. Her behavior is normal. Thought content normal.       Assessment:     1. Osteopenia, unspecified location    2. Visit for screening mammogram    3. Fatigue, unspecified type    4. Asymptomatic postmenopausal state    5. Need for shingles vaccine    6. Chronic midline low back pain without sciatica    7. Irritable bowel syndrome, unspecified type    8. Vitamin D insufficiency    9. Gastroesophageal reflux disease without esophagitis    10. Chronic pain of right knee     11. Unsteadiness on feet     12. Need for vaccination with 13-polyvalent pneumococcal conjugate vaccine    13. Gait abnormality           Plan:         1. Osteopenia, unspecified location  - ibandronate (BONIVA) 150 mg tablet; Take 1 tablet (150 mg total) by mouth every 30 days.  Dispense: 3 tablet; Refill: 3  - DXA Bone Density Spine And Hip; Future  - TSH; Future  - Vitamin D; Future  - Iron and TIBC; Future  - Vitamin B12; Future    2. Visit for screening mammogram  - Mammo Digital Screening Bilat with CAD; Future    3. Fatigue, unspecified type  - TSH; Future  - Vitamin D; Future  - Iron and TIBC; Future  - Vitamin B12; Future    4. Asymptomatic postmenopausal state  - DXA Bone Density Spine And Hip; Future    5. Need for  shingles vaccine  - varicella-zoster gE-AS01B, PF, (SHINGRIX, PF,) 50 mcg/0.5 mL injection; Inject 0.5 mLs into the muscle once. for 1 dose  Dispense: 0.5 mL; Refill: 1    6. Chronic midline low back pain without sciatica  - celecoxib (CELEBREX) 200 MG capsule; Take 1 capsule (200 mg total) by mouth 2 (two) times daily as needed for Pain. Take with food  Dispense: 60 capsule; Refill: 2  - gabapentin (NEURONTIN) 300 MG capsule; Take 1 capsule (300 mg total) by mouth every evening.  Dispense: 90 capsule; Refill: 3  - Vitamin B12; Future    7. Irritable bowel syndrome, unspecified type  - dicyclomine (BENTYL) 10 MG capsule; Take 1 capsule (10 mg total) by mouth before meals as needed.  Dispense: 90 capsule; Refill: 6  - TSH; Future  - Iron and TIBC; Future  - Vitamin B12; Future    8. Vitamin D insufficiency  - Vitamin D; Future    9. Gastroesophageal reflux disease without esophagitis  - continue pantoprazole  - Iron and TIBC; Future    10. Chronic pain of right knee   - celebrex prn  - Iron and TIBC; Future    11. Unsteadiness on feet   - refer to PT  - seems more so like she tripped over objects rather than unstaediness  - Vitamin B12; Future    12. Need for vaccination with 13-polyvalent pneumococcal conjugate vaccine  - Pneumococcal Conjugate Vaccine (13 Valent) (IM)    13. Gait abnormality  - encouraged her to notify me if no improvement.  - Ambulatory consult to Physical Therapy

## 2018-09-07 ENCOUNTER — PATIENT MESSAGE (OUTPATIENT)
Dept: INTERNAL MEDICINE | Facility: CLINIC | Age: 63
End: 2018-09-07

## 2018-09-07 NOTE — TELEPHONE ENCOUNTER
Please inform patient of test results:   Vitamin D slightly low. I recommend vitamin D3 2000 units otc daily.  Thyroid, vitamin B12 and iron levels are normal.

## 2018-12-03 ENCOUNTER — APPOINTMENT (OUTPATIENT)
Dept: RADIOLOGY | Facility: CLINIC | Age: 63
End: 2018-12-03
Attending: INTERNAL MEDICINE
Payer: MEDICARE

## 2018-12-03 DIAGNOSIS — Z78.0 ASYMPTOMATIC POSTMENOPAUSAL STATE: ICD-10-CM

## 2018-12-03 DIAGNOSIS — M85.80 OSTEOPENIA, UNSPECIFIED LOCATION: ICD-10-CM

## 2018-12-03 PROCEDURE — 77080 DXA BONE DENSITY AXIAL: CPT | Mod: TC,PO

## 2018-12-03 PROCEDURE — 77080 DXA BONE DENSITY AXIAL: CPT | Mod: 26,,, | Performed by: INTERNAL MEDICINE

## 2018-12-05 ENCOUNTER — OFFICE VISIT (OUTPATIENT)
Dept: INTERNAL MEDICINE | Facility: CLINIC | Age: 63
End: 2018-12-05
Payer: MEDICARE

## 2018-12-05 VITALS
SYSTOLIC BLOOD PRESSURE: 108 MMHG | RESPIRATION RATE: 16 BRPM | BODY MASS INDEX: 26.16 KG/M2 | HEART RATE: 78 BPM | DIASTOLIC BLOOD PRESSURE: 69 MMHG | TEMPERATURE: 99 F | HEIGHT: 67 IN | WEIGHT: 166.69 LBS

## 2018-12-05 DIAGNOSIS — J06.9 UPPER RESPIRATORY TRACT INFECTION, UNSPECIFIED TYPE: Primary | ICD-10-CM

## 2018-12-05 PROCEDURE — 99213 OFFICE O/P EST LOW 20 MIN: CPT | Mod: S$PBB,,, | Performed by: INTERNAL MEDICINE

## 2018-12-05 PROCEDURE — 99213 OFFICE O/P EST LOW 20 MIN: CPT | Mod: PBBFAC,PO | Performed by: INTERNAL MEDICINE

## 2018-12-05 PROCEDURE — 99999 PR PBB SHADOW E&M-EST. PATIENT-LVL III: CPT | Mod: PBBFAC,,, | Performed by: INTERNAL MEDICINE

## 2018-12-05 RX ORDER — LEVOCETIRIZINE DIHYDROCHLORIDE 5 MG/1
5 TABLET, FILM COATED ORAL NIGHTLY
Qty: 30 TABLET | Refills: 11 | Status: SHIPPED | OUTPATIENT
Start: 2018-12-05 | End: 2020-03-05

## 2018-12-05 RX ORDER — FLUTICASONE PROPIONATE 50 MCG
2 SPRAY, SUSPENSION (ML) NASAL DAILY
Qty: 16 G | Refills: 12 | Status: SHIPPED | OUTPATIENT
Start: 2018-12-05 | End: 2019-01-04

## 2018-12-05 RX ORDER — BENZONATATE 100 MG/1
100 CAPSULE ORAL 3 TIMES DAILY PRN
Qty: 30 CAPSULE | Refills: 0 | Status: SHIPPED | OUTPATIENT
Start: 2018-12-05 | End: 2018-12-15

## 2018-12-05 NOTE — PATIENT INSTRUCTIONS
Take an antihistamine daily (allegra/ zyrtec/ xyzal/ or claritin). You may also take a benadryl at night if symptoms are uncontrolled or affecting sleep- only do this for a few nights.   Use a nasal steroid spray such as Flonase (fluticasone) or Nasacort (triamcinolone) daily. This will help with congestion, sinus pressure, and ear pressure/fluid.  Generic medications are okay.  Continue this regimen for at least 2 weeks. Expect symptoms to last 7-10 days.   Nasal saline rinse or spray (Simply Saline/ Ocean spray/ Nasal Mist) will help with congestion. Only use sterile water if using a neti-pot. If using the spray, lean head back and spray each nostril for 2-3 seconds - you should taste salt water in back of throat.   You can take a decongestant such as sudafed to help with sinus pressure and congestion. Avoid decongestant medications if you have heart disease, abnormal heart rhythm, or uncontrolled high blood pressure. If you have well controlled high blood pressure, make sure to monitor your blood pressure at home while taking this medication.  Dextromethorphan (DM, delsym) will help to suppress coughing. Do night drive if drowsy.  You can add guaifenesin (mucinex) to help breath up thick sputum- make sure to drink plenty of water in order to help thin the mucus.   Warm salt water gargles to alleviate sore throat or lozenges or chloraseptic spray.  NSAIDs (aleve, advil) and tylenol can help with pain, aches and fever.  Make sure to stay well hydrated.   To avoid spreading symptoms: wash hands or use hand  frequently. Cover face when coughing. Do not share utensils, etc.     Antibiotics are not useful for a viral infection, we must treat symptoms instead. Unnecessary antibiotics could cause diarrhea or make you prone to infections caused by resistant organisms.  If you develop a new or worsening fever, please call our office.

## 2018-12-05 NOTE — PROGRESS NOTES
"Subjective:       Patient ID: Amira Araujo is a 63 y.o. female.    Chief Complaint: Sore Throat and Cough    HPI    Her symptoms started on Sunday with sore throat. Since then, she has developed a cough. It is mostly a nonproductive cough, but sometimes is productive of white/clear phlegm. She felt like she had a low grade fever last night. She denies dyspnea, or chest tightness.     Review of Systems   Constitutional: Negative for activity change and fever.   HENT: Positive for congestion, postnasal drip, rhinorrhea, sneezing, sore throat and voice change. Negative for ear pain.    Eyes: Negative for discharge.   Respiratory: Positive for cough. Negative for chest tightness and shortness of breath.    Cardiovascular: Negative for chest pain.   Musculoskeletal: Negative for myalgias.   Neurological: Negative for weakness.       Objective:        Vitals:    12/05/18 1334   BP: 108/69   Pulse: 78   Resp: 16   Temp: 98.6 °F (37 °C)   TempSrc: Oral   Weight: 75.6 kg (166 lb 10.7 oz)   Height: 5' 7" (1.702 m)       Body mass index is 26.1 kg/m².    Physical Exam   Constitutional: She appears well-developed and well-nourished.   HENT:   Head: Normocephalic and atraumatic.   Right Ear: Tympanic membrane and external ear normal. No middle ear effusion.   Left Ear: Tympanic membrane and external ear normal.  No middle ear effusion.   Nose: Rhinorrhea present. Right sinus exhibits no maxillary sinus tenderness and no frontal sinus tenderness. Left sinus exhibits no maxillary sinus tenderness and no frontal sinus tenderness.   Mouth/Throat: Posterior oropharyngeal erythema present. No tonsillar exudate.   + post nasal drainage   Eyes: Conjunctivae and EOM are normal.   Cardiovascular: Normal rate, regular rhythm, normal heart sounds and intact distal pulses.   Pulmonary/Chest: Effort normal and breath sounds normal. No respiratory distress. She has no wheezes. She has no rales.   Lymphadenopathy:     She has no cervical " adenopathy.   Skin: Skin is warm and dry.   Psychiatric: She has a normal mood and affect.       Assessment:     1. Upper respiratory tract infection, unspecified type           Plan:         1. Upper respiratory tract infection, unspecified type  - symptomatic tx info d/t pt and provided w/ AVS  - fluticasone (FLONASE) 50 mcg/actuation nasal spray; 2 sprays (100 mcg total) by Each Nare route once daily.  Dispense: 16 g; Refill: 12  - levocetirizine (XYZAL) 5 MG tablet; Take 1 tablet (5 mg total) by mouth every evening.  Dispense: 30 tablet; Refill: 11  - benzonatate (TESSALON) 100 MG capsule; Take 1 capsule (100 mg total) by mouth 3 (three) times daily as needed for Cough.  Dispense: 30 capsule; Refill: 0           Patient note was created using MModal Dictation.  Any errors in syntax or even information may not have been identified and edited on initial review prior to signing this note.

## 2018-12-06 ENCOUNTER — HOSPITAL ENCOUNTER (OUTPATIENT)
Dept: RADIOLOGY | Facility: HOSPITAL | Age: 63
Discharge: HOME | End: 2018-12-06
Attending: OBSTETRICS & GYNECOLOGY
Payer: MEDICARE

## 2018-12-06 DIAGNOSIS — Z85.3 HX OF BREAST CANCER: ICD-10-CM

## 2018-12-06 PROCEDURE — 77066 DX MAMMO INCL CAD BI: CPT | Mod: 26,,, | Performed by: RADIOLOGY

## 2018-12-06 PROCEDURE — 77062 BREAST TOMOSYNTHESIS BI: CPT | Mod: TC,PO

## 2018-12-06 PROCEDURE — 77062 BREAST TOMOSYNTHESIS BI: CPT | Mod: 26,,, | Performed by: RADIOLOGY

## 2018-12-07 ENCOUNTER — PATIENT MESSAGE (OUTPATIENT)
Dept: INTERNAL MEDICINE | Facility: CLINIC | Age: 63
End: 2018-12-07

## 2018-12-07 ENCOUNTER — TELEPHONE (OUTPATIENT)
Dept: OBSTETRICS AND GYNECOLOGY | Facility: CLINIC | Age: 63
End: 2018-12-07

## 2019-03-13 ENCOUNTER — PATIENT MESSAGE (OUTPATIENT)
Dept: INTERNAL MEDICINE | Facility: CLINIC | Age: 64
End: 2019-03-13

## 2019-03-20 ENCOUNTER — OFFICE VISIT (OUTPATIENT)
Dept: INTERNAL MEDICINE | Facility: CLINIC | Age: 64
End: 2019-03-20
Payer: MEDICARE

## 2019-03-20 VITALS
SYSTOLIC BLOOD PRESSURE: 100 MMHG | WEIGHT: 176.56 LBS | HEART RATE: 74 BPM | BODY MASS INDEX: 27.71 KG/M2 | RESPIRATION RATE: 16 BRPM | DIASTOLIC BLOOD PRESSURE: 66 MMHG | HEIGHT: 67 IN | TEMPERATURE: 99 F

## 2019-03-20 DIAGNOSIS — Z01.818 PREOP EXAMINATION: Primary | ICD-10-CM

## 2019-03-20 PROCEDURE — 99214 OFFICE O/P EST MOD 30 MIN: CPT | Mod: S$PBB,,, | Performed by: INTERNAL MEDICINE

## 2019-03-20 PROCEDURE — 99999 PR PBB SHADOW E&M-EST. PATIENT-LVL III: CPT | Mod: PBBFAC,,, | Performed by: INTERNAL MEDICINE

## 2019-03-20 PROCEDURE — 99214 PR OFFICE/OUTPT VISIT, EST, LEVL IV, 30-39 MIN: ICD-10-PCS | Mod: S$PBB,,, | Performed by: INTERNAL MEDICINE

## 2019-03-20 PROCEDURE — 99999 PR PBB SHADOW E&M-EST. PATIENT-LVL III: ICD-10-PCS | Mod: PBBFAC,,, | Performed by: INTERNAL MEDICINE

## 2019-03-20 PROCEDURE — 99213 OFFICE O/P EST LOW 20 MIN: CPT | Mod: PBBFAC,PO | Performed by: INTERNAL MEDICINE

## 2019-03-20 RX ORDER — CYANOCOBALAMIN (VITAMIN B-12) 500 MCG
2 TABLET ORAL NIGHTLY
COMMUNITY

## 2019-03-20 NOTE — PROGRESS NOTES
Subjective:       Patient ID: Amira Araujo is a 63 y.o. female.    Chief Complaint: Pre-op Exam    HPI    63 y.o. female here for pre-op evaluation of right knee arthroscopy by Dr. Oropeza planned for 4/2/2019. She was recently hospitalized at [facility] for chest pain. Myocardial SPECT w/ rest and exercise was normal w/o perfusion defects or wall motion abnormalities. She saw her cardiologist this morning, who cleared her. She is no longer experiencing chest pain.     History of prior anesthetic complications: no  Chronic Steroid usage: no  no tobacco, social EtOH, no Illicit substances     Surgical Risk Assessment     Active cardiac issues:  Active decompensated heart failure? No   Unstable angina?  No   Significant uncontrolled arrhythmias? No   Severe valvular heart disease-Aortic or Mitral Stenosis? No   Recent MI or coronary revascularization < 30 days? No     Clinical risk factors predicting perioperative major adverse cardiac events per RCRI  High risk surgery (suprainguinal vascular, intraperitoneal, or intrathoracic surgery)? No   History of CAD/ischemic heart disease? No   History of cerebrovascular disease (CVA or TIA)? No   History of compensated heart failure? No   Type 2 diabetes requiring insulin? No   Serum Creatinine > 2? No   Total cardiac risk factors 0     0 predictors = 0.4%, 1 predictor = 0.9%, 2 predictors = 6.6%, ?3 predictors = >11%    According to the revised cardiac risk index, the risk of danica-procedural major cardiac complications (cardiac death, nonfatal MI, nonfatal cardiac arrest, postoperative cardiogenic pulmonary edema, complete heart block) is: 0.4%     If patient has a low risk of MACE (<1%), proceed to surgery. If the patient is at elevated risk of MACE, then determine functional capacity (pt reported activity or DASI model).     If the patient has moderate, good, or excellent functional capacity (?4 METs), then proceed to surgery without further evaluation. If patient has poor  "or unknown functional capacity, will further testing impact decision making or perioperative care? If yes, then pharmacological stress testing is appropriate. In those patients with unknown functional capacity, exercise stress testing may be reasonable to perform.     Patient's functional mets: > 4 METs - bikes for 35 minutes for exercise    < 4 METs -unable to walk > 2 blocks on level ground without stopping due to symptoms  - eating, dressing, toileting, walking indoors, light housework. POOR   > 4 METs -climbing > 1 flight of stairs without stopping  -walking up hill > 1-2 blocks  -scrubbing floors  -moving furniture  - golf, bowling, dancing or tennis  -running short distance MODERATE to EXCELLENT     OR   [email or web address]      Review of Systems   Constitutional: Positive for activity change. Negative for unexpected weight change.   HENT: Negative for hearing loss, rhinorrhea and trouble swallowing.    Eyes: Negative for discharge and visual disturbance.   Respiratory: Negative for chest tightness and wheezing.    Cardiovascular: Negative for chest pain and palpitations.   Gastrointestinal: Negative for blood in stool, constipation, diarrhea and vomiting.   Endocrine: Negative for polydipsia and polyuria.   Genitourinary: Negative for difficulty urinating, dysuria, hematuria and menstrual problem.   Musculoskeletal: Positive for arthralgias and joint swelling. Negative for neck pain.   Neurological: Negative for weakness and headaches.   Psychiatric/Behavioral: Negative for confusion and dysphoric mood.       Objective:        Vitals:    03/20/19 1339   BP: 100/66   Pulse: 74   Resp: 16   Temp: 98.7 °F (37.1 °C)   TempSrc: Oral   Weight: 80.1 kg (176 lb 9.4 oz)   Height: 5' 7" (1.702 m)       Body mass index is 27.66 kg/m².    Physical Exam   Constitutional: She is oriented to person, place, and time. She appears well-developed and well-nourished. No distress.   HENT: "   Head: Normocephalic and atraumatic.   Nose: Nose normal.   Eyes: Conjunctivae and EOM are normal. Right eye exhibits no discharge. Left eye exhibits no discharge.   Neck: Normal range of motion. Neck supple.   Cardiovascular: Normal rate, regular rhythm, normal heart sounds and intact distal pulses.   Pulmonary/Chest: Effort normal and breath sounds normal.   Abdominal: Soft. Bowel sounds are normal.   Musculoskeletal: She exhibits no edema.   Neurological: She is alert and oriented to person, place, and time.   Skin: Skin is warm and dry. She is not diaphoretic. No erythema.   Psychiatric: She has a normal mood and affect. Her behavior is normal. Thought content normal.       Assessment:     1. Preop examination           Plan:         1. Preop examination  - No contraindications to anesthesia or surgery at this time. Proceed to planned surgery.  - she will have her surgeon fax the pre-op form to our clinic.  - records requested from , mainly for updated lab results (need updated kidney function). She brought in a copy of her stress test, which will be uploaded to her medical record.            Patient note was created using MModal Dictation.  Any errors in syntax or even information may not have been identified and edited on initial review prior to signing this note.

## 2019-11-21 ENCOUNTER — TELEPHONE (OUTPATIENT)
Dept: INTERNAL MEDICINE | Facility: CLINIC | Age: 64
End: 2019-11-21

## 2019-11-21 DIAGNOSIS — Z12.31 VISIT FOR SCREENING MAMMOGRAM: Primary | ICD-10-CM

## 2019-11-21 NOTE — TELEPHONE ENCOUNTER
----- Message from Jaylin Diaz sent at 11/21/2019  3:41 PM CST -----  Contact: [phone]  Type: Orders Request    What orders/ testing are being requested? 3D Mammogram    Is there a future appointment scheduled for the patient with PCP? yese    When?  1-    Would you prefer a response via [facility]?  Please call patient when order is in to schedule.    Comments:  Please advise, thank you.

## 2019-11-21 NOTE — TELEPHONE ENCOUNTER
I put in order earlier and sent patient msg to schedule with phone staff.    I left msg I was booking mammo on same day seeing dr kessler, if time doesn't work for her change with phone staff.    Not sure why phone staff couldn't schedule her mammo in the first place.

## 2020-01-15 ENCOUNTER — HOSPITAL ENCOUNTER (OUTPATIENT)
Dept: RADIOLOGY | Facility: HOSPITAL | Age: 65
Discharge: HOME | End: 2020-01-15
Attending: INTERNAL MEDICINE
Payer: MEDICARE

## 2020-01-15 VITALS — WEIGHT: 176.56 LBS | BODY MASS INDEX: 27.66 KG/M2

## 2020-01-15 DIAGNOSIS — Z12.31 VISIT FOR SCREENING MAMMOGRAM: ICD-10-CM

## 2020-01-15 PROCEDURE — 77063 MAMMO DIGITAL SCREENING BILAT WITH TOMOSYNTHESIS_CAD: ICD-10-PCS | Mod: 26,,, | Performed by: RADIOLOGY

## 2020-01-15 PROCEDURE — 77067 MAMMO DIGITAL SCREENING BILAT WITH TOMOSYNTHESIS_CAD: ICD-10-PCS | Mod: 26,,, | Performed by: RADIOLOGY

## 2020-01-15 PROCEDURE — 77067 SCR MAMMO BI INCL CAD: CPT | Mod: 26,,, | Performed by: RADIOLOGY

## 2020-01-15 PROCEDURE — 77067 SCR MAMMO BI INCL CAD: CPT | Mod: TC

## 2020-01-15 PROCEDURE — 77063 BREAST TOMOSYNTHESIS BI: CPT | Mod: 26,,, | Performed by: RADIOLOGY

## 2020-01-17 ENCOUNTER — TELEPHONE (OUTPATIENT)
Dept: INTERNAL MEDICINE | Facility: CLINIC | Age: 65
End: 2020-01-17

## 2020-01-17 DIAGNOSIS — K58.9 IRRITABLE BOWEL SYNDROME, UNSPECIFIED TYPE: ICD-10-CM

## 2020-01-17 DIAGNOSIS — Z13.6 SCREENING FOR CARDIOVASCULAR CONDITION: ICD-10-CM

## 2020-01-17 DIAGNOSIS — M85.80 OSTEOPENIA, UNSPECIFIED LOCATION: ICD-10-CM

## 2020-01-17 DIAGNOSIS — K21.9 GASTROESOPHAGEAL REFLUX DISEASE WITHOUT ESOPHAGITIS: ICD-10-CM

## 2020-01-17 DIAGNOSIS — Z00.00 ANNUAL PHYSICAL EXAM: Primary | ICD-10-CM

## 2020-01-17 DIAGNOSIS — G47.09 OTHER INSOMNIA: ICD-10-CM

## 2020-01-17 DIAGNOSIS — E55.9 VITAMIN D INSUFFICIENCY: ICD-10-CM

## 2020-01-17 NOTE — TELEPHONE ENCOUNTER
----- Message from Avis Rodriguez sent at 1/17/2020  4:30 PM CST -----  Contact: pt-- [phone]  Type:  Needs Medical Advice    Who Called:  Pt    Would the patient rather a call back or a response via [facility]? Call    Best Call Back Number:  [phone]    Additional Information: pt scheduled for labs on 2/27 without orders.

## 2020-01-24 ENCOUNTER — TELEPHONE (OUTPATIENT)
Dept: INTERNAL MEDICINE | Facility: CLINIC | Age: 65
End: 2020-01-24

## 2020-01-24 NOTE — TELEPHONE ENCOUNTER
----- Message from Chloé Shepherd MD sent at 1/24/2020 12:06 PM CST -----  Results released to patient portal.

## 2020-02-27 ENCOUNTER — LAB VISIT (OUTPATIENT)
Dept: LAB | Facility: HOSPITAL | Age: 65
End: 2020-02-27
Attending: INTERNAL MEDICINE
Payer: MEDICARE

## 2020-02-27 DIAGNOSIS — Z00.00 ANNUAL PHYSICAL EXAM: ICD-10-CM

## 2020-02-27 DIAGNOSIS — K58.9 IRRITABLE BOWEL SYNDROME, UNSPECIFIED TYPE: ICD-10-CM

## 2020-02-27 DIAGNOSIS — Z13.6 SCREENING FOR CARDIOVASCULAR CONDITION: ICD-10-CM

## 2020-02-27 DIAGNOSIS — K21.9 GASTROESOPHAGEAL REFLUX DISEASE WITHOUT ESOPHAGITIS: ICD-10-CM

## 2020-02-27 DIAGNOSIS — G47.09 OTHER INSOMNIA: ICD-10-CM

## 2020-02-27 DIAGNOSIS — E55.9 VITAMIN D INSUFFICIENCY: ICD-10-CM

## 2020-02-27 LAB
25(OH)D3+25(OH)D2 SERPL-MCNC: 22 NG/ML (ref 30–96)
ALBUMIN SERPL BCP-MCNC: 3.3 G/DL (ref 3.5–5.2)
ALP SERPL-CCNC: 53 U/L (ref 55–135)
ALT SERPL W/O P-5'-P-CCNC: 19 U/L (ref 10–44)
ANION GAP SERPL CALC-SCNC: 5 MMOL/L (ref 8–16)
AST SERPL-CCNC: 16 U/L (ref 10–40)
BASOPHILS # BLD AUTO: 0.06 K/UL (ref 0–0.2)
BASOPHILS NFR BLD: 1.2 % (ref 0–1.9)
BILIRUB SERPL-MCNC: 0.5 MG/DL (ref 0.1–1)
BUN SERPL-MCNC: 15 MG/DL (ref 8–23)
CALCIUM SERPL-MCNC: 8.7 MG/DL (ref 8.7–10.5)
CHLORIDE SERPL-SCNC: 111 MMOL/L (ref 95–110)
CHOLEST SERPL-MCNC: 172 MG/DL (ref 120–199)
CHOLEST/HDLC SERPL: 2.9 {RATIO} (ref 2–5)
CO2 SERPL-SCNC: 26 MMOL/L (ref 23–29)
CREAT SERPL-MCNC: 0.7 MG/DL (ref 0.5–1.4)
DIFFERENTIAL METHOD: ABNORMAL
EOSINOPHIL # BLD AUTO: 0.1 K/UL (ref 0–0.5)
EOSINOPHIL NFR BLD: 1.8 % (ref 0–8)
ERYTHROCYTE [DISTWIDTH] IN BLOOD BY AUTOMATED COUNT: 12.8 % (ref 11.5–14.5)
EST. GFR  (AFRICAN AMERICAN): >60 ML/MIN/1.73 M^2
EST. GFR  (NON AFRICAN AMERICAN): >60 ML/MIN/1.73 M^2
GLUCOSE SERPL-MCNC: 96 MG/DL (ref 70–110)
HCT VFR BLD AUTO: 42.2 % (ref 37–48.5)
HDLC SERPL-MCNC: 60 MG/DL (ref 40–75)
HDLC SERPL: 34.9 % (ref 20–50)
HGB BLD-MCNC: 13 G/DL (ref 12–16)
IMM GRANULOCYTES # BLD AUTO: 0.01 K/UL (ref 0–0.04)
IMM GRANULOCYTES NFR BLD AUTO: 0.2 % (ref 0–0.5)
LDLC SERPL CALC-MCNC: 89 MG/DL (ref 63–159)
LYMPHOCYTES # BLD AUTO: 1.6 K/UL (ref 1–4.8)
LYMPHOCYTES NFR BLD: 31.7 % (ref 18–48)
MCH RBC QN AUTO: 30.3 PG (ref 27–31)
MCHC RBC AUTO-ENTMCNC: 30.8 G/DL (ref 32–36)
MCV RBC AUTO: 98 FL (ref 82–98)
MONOCYTES # BLD AUTO: 0.5 K/UL (ref 0.3–1)
MONOCYTES NFR BLD: 9.8 % (ref 4–15)
NEUTROPHILS # BLD AUTO: 2.8 K/UL (ref 1.8–7.7)
NEUTROPHILS NFR BLD: 55.3 % (ref 38–73)
NONHDLC SERPL-MCNC: 112 MG/DL
NRBC BLD-RTO: 0 /100 WBC
PLATELET # BLD AUTO: 268 K/UL (ref 150–350)
PMV BLD AUTO: 11 FL (ref 9.2–12.9)
POTASSIUM SERPL-SCNC: 4.4 MMOL/L (ref 3.5–5.1)
PROT SERPL-MCNC: 6.5 G/DL (ref 6–8.4)
RBC # BLD AUTO: 4.29 M/UL (ref 4–5.4)
SODIUM SERPL-SCNC: 142 MMOL/L (ref 136–145)
TRIGL SERPL-MCNC: 115 MG/DL (ref 30–150)
TSH SERPL DL<=0.005 MIU/L-ACNC: 0.53 UIU/ML (ref 0.4–4)
WBC # BLD AUTO: 4.98 K/UL (ref 3.9–12.7)

## 2020-02-27 PROCEDURE — 84443 ASSAY THYROID STIM HORMONE: CPT

## 2020-02-27 PROCEDURE — 80053 COMPREHEN METABOLIC PANEL: CPT

## 2020-02-27 PROCEDURE — 82306 VITAMIN D 25 HYDROXY: CPT

## 2020-02-27 PROCEDURE — 85025 COMPLETE CBC W/AUTO DIFF WBC: CPT

## 2020-02-27 PROCEDURE — 80061 LIPID PANEL: CPT

## 2020-02-27 PROCEDURE — 36415 COLL VENOUS BLD VENIPUNCTURE: CPT | Mod: PO

## 2020-03-05 ENCOUNTER — OFFICE VISIT (OUTPATIENT)
Dept: INTERNAL MEDICINE | Facility: CLINIC | Age: 65
End: 2020-03-05
Payer: MEDICARE

## 2020-03-05 VITALS
TEMPERATURE: 98 F | HEIGHT: 67 IN | BODY MASS INDEX: 28.34 KG/M2 | HEART RATE: 70 BPM | WEIGHT: 180.56 LBS | DIASTOLIC BLOOD PRESSURE: 78 MMHG | SYSTOLIC BLOOD PRESSURE: 130 MMHG | RESPIRATION RATE: 18 BRPM

## 2020-03-05 DIAGNOSIS — E55.9 VITAMIN D INSUFFICIENCY: ICD-10-CM

## 2020-03-05 DIAGNOSIS — K58.9 IRRITABLE BOWEL SYNDROME, UNSPECIFIED TYPE: ICD-10-CM

## 2020-03-05 DIAGNOSIS — B35.4 TINEA CORPORIS: ICD-10-CM

## 2020-03-05 DIAGNOSIS — Z00.00 ANNUAL PHYSICAL EXAM: Primary | ICD-10-CM

## 2020-03-05 PROCEDURE — 99215 PR OFFICE/OUTPT VISIT, EST, LEVL V, 40-54 MIN: ICD-10-PCS | Mod: S$PBB,,, | Performed by: INTERNAL MEDICINE

## 2020-03-05 PROCEDURE — 99999 PR PBB SHADOW E&M-EST. PATIENT-LVL III: CPT | Mod: PBBFAC,,, | Performed by: INTERNAL MEDICINE

## 2020-03-05 PROCEDURE — 99215 OFFICE O/P EST HI 40 MIN: CPT | Mod: S$PBB,,, | Performed by: INTERNAL MEDICINE

## 2020-03-05 PROCEDURE — 99999 PR PBB SHADOW E&M-EST. PATIENT-LVL III: ICD-10-PCS | Mod: PBBFAC,,, | Performed by: INTERNAL MEDICINE

## 2020-03-05 PROCEDURE — 99213 OFFICE O/P EST LOW 20 MIN: CPT | Mod: PBBFAC,PO | Performed by: INTERNAL MEDICINE

## 2020-03-05 RX ORDER — VIT C/E/ZN/COPPR/LUTEIN/ZEAXAN 250MG-90MG
2000 CAPSULE ORAL DAILY
Refills: 0 | COMMUNITY
Start: 2020-03-05

## 2020-03-05 RX ORDER — DICYCLOMINE HYDROCHLORIDE 10 MG/1
10 CAPSULE ORAL 3 TIMES DAILY PRN
Qty: 60 CAPSULE | Refills: 2 | Status: SHIPPED | OUTPATIENT
Start: 2020-03-05 | End: 2021-01-20

## 2020-03-05 RX ORDER — CLOTRIMAZOLE AND BETAMETHASONE DIPROPIONATE 10; .64 MG/G; MG/G
CREAM TOPICAL 2 TIMES DAILY
Qty: 15 G | Refills: 1 | Status: SHIPPED | OUTPATIENT
Start: 2020-03-05 | End: 2021-01-20

## 2020-03-05 NOTE — Clinical Note
Pt has utd colonoscopy that is uploaded under the media section, can you link it to health maintenance? Testicular pain, left

## 2020-03-05 NOTE — PROGRESS NOTES
Subjective:       Patient ID: Amira Araujo is a 64 y.o. female.    Chief Complaint: Annual Exam (spot on right leg)    HPI    64 y.o. female here to discuss healthcare maintenance and f/u of chronic medical conditions.      Health Maintenance:   Lipid disorders/ASCVD risk: utd    The 10-year ASCVD risk score (Mikie OTOOLE Jr., et al., 2013) is: 5.7%    Values used to calculate the score:      Age: 64 years      Sex: Female      Is Non- : No      Diabetic: No      Tobacco smoker: No      Systolic Blood Pressure: 130 mmHg      Is BP treated: No      HDL Cholesterol: 60 mg/dL      Total Cholesterol: 172 mg/dL  DM: FBG 96  Hepatitis C ([id number]): completed   Breast Cancer (40-74y): Mammogram 01/20   Colorectal Cancer (50-75yr): Colonoscopy 10/2014 Dr. Wong  Bone Density: DEXA 2018       Vaccines:   Influenza: utd   Tetanus: 2014    PPSV23: 2015   PCV13: 2018   Shingrix: completed         Past Medical History:   Diagnosis Date    Atypical ductal hyperplasia, breast     Breast disorder     Ductal carcinoma in situ (DCIS) of right breast 10/2015    - lumpectomy 11/2015    History of bone density study 08/20/2014    osteopenia- mild T-score Hip: -1.37, Spine: 1.36    IBS (irritable bowel syndrome)     Menopause 2001    Osteopenia 08/20/2014    Mild      Past Surgical History:   Procedure Laterality Date    BREAST LUMPECTOMY Left 11/2015    BROW LIFT AND BLEPHAROPLASTY Bilateral 2013    COLONOSCOPY  2014    5mm sessile polyp in sigmoid colon (pathology - no tissue present); diverticulosis, internal hemorrhoids    TUBAL LIGATION  07/04/1985    WISDOM TOOTH EXTRACTION       Family History   Problem Relation Age of Onset    Lung cancer Father     Prostate cancer Father 73    Heart attack Father 53    Coronary artery disease Father     Hypertension Father     Hyperlipidemia Father     Stroke Mother 75    Breast cancer Sister     Colon cancer Neg Hx     Ovarian cancer Neg Hx      Diabetes Neg Hx     Melanoma Neg Hx      Social History     Socioeconomic History    Marital status:      Spouse name: Not on file    Number of children: Not on file    Years of education: Not on file    Highest education level: Not on file   Occupational History    Not on file   Social Needs    Financial resource strain: Not on file    Food insecurity:     Worry: Not on file     Inability: Not on file    Transportation needs:     Medical: Not on file     Non-medical: Not on file   Tobacco Use    Smoking status: Never Smoker    Smokeless tobacco: Never Used   Substance and Sexual Activity    Alcohol use: Yes     Comment: socially     Drug use: No    Sexual activity: Yes     Partners: Male     Birth control/protection: Post-menopausal     Comment:     Lifestyle    Physical activity:     Days per week: Not on file     Minutes per session: Not on file    Stress: Not on file   Relationships    Social connections:     Talks on phone: Not on file     Gets together: Not on file     Attends Shinto service: Not on file     Active member of club or organization: Not on file     Attends meetings of clubs or organizations: Not on file     Relationship status: Not on file   Other Topics Concern    Are you pregnant or think you may be? 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      Review of patient's allergies indicates:  No Known Allergies    Current Outpatient Medications:     celecoxib (CELEBREX) 200 MG capsule, Take 1 capsule (200 mg total) by mouth 2 (two) times daily as needed for Pain. Take with food, Disp: 60 capsule, Rfl: 2    gabapentin (NEURONTIN) 300 MG capsule, Take 1 capsule (300 mg total) by mouth every evening., Disp: 90 capsule, Rfl: 3    melatonin 1 mg Tab, Take 2 mg by mouth every evening., Disp: , Rfl:     cholecalciferol, vitamin D3, (VITAMIN D3) 25 mcg (1,000 unit)  "capsule, Take 2 capsules (2,000 Units total) by mouth once daily., Disp: , Rfl: 0    clotrimazole-betamethasone 1-0.05% (LOTRISONE) cream, Apply topically 2 (two) times daily., Disp: 15 g, Rfl: 1    dicyclomine (BENTYL) 10 MG capsule, Take 1 capsule (10 mg total) by mouth 3 (three) times daily as needed (abdominal pain)., Disp: 60 capsule, Rfl: 2      Review of Systems   Constitutional: Negative for diaphoresis and fever.   HENT: Negative for dental problem and trouble swallowing.    Eyes: Negative for discharge and redness.   Respiratory: Negative for cough and shortness of breath.    Cardiovascular: Negative for chest pain and leg swelling.   Gastrointestinal: Positive for abdominal pain (periumbilical discomfort that occurs sporadically after eating food, no clear trigger foods). Negative for blood in stool, constipation, diarrhea, nausea and vomiting.   Genitourinary: Negative for difficulty urinating and dysuria.   Musculoskeletal: Positive for arthralgias (knee pain). Negative for gait problem and joint swelling.   Skin: Positive for rash (round lesion on leg). Negative for pallor and wound.   Neurological: Negative for weakness and numbness.       Objective:        Vitals:    03/05/20 0903   BP: 130/78   Pulse: 70   Resp: 18   Temp: 98.3 °F (36.8 °C)   TempSrc: Oral   Weight: 81.9 kg (180 lb 8.9 oz)   Height: 5' 7" (1.702 m)       Body mass index is 28.28 kg/m².    Physical Exam   Constitutional: She is oriented to person, place, and time. She appears well-developed. No distress.   HENT:   Head: Normocephalic and atraumatic.   Right Ear: Tympanic membrane normal.   Left Ear: Tympanic membrane normal.   Nose: Nose normal.   Mouth/Throat: Oropharynx is clear and moist.   Eyes: Conjunctivae and EOM are normal.   Neck: Normal range of motion. Neck supple. No tracheal deviation present. No thyromegaly present.   Cardiovascular: Normal rate, regular rhythm, normal heart sounds and intact distal pulses. "   Pulmonary/Chest: Effort normal and breath sounds normal. Right breast exhibits no inverted nipple, no mass, no nipple discharge and no skin change. Left breast exhibits no inverted nipple, no mass, no nipple discharge and no skin change.   Abdominal: Soft. Bowel sounds are normal. She exhibits no distension. There is no tenderness.   Musculoskeletal: Normal range of motion. She exhibits no edema.   Lymphadenopathy:     She has no cervical adenopathy.   Neurological: She is alert and oriented to person, place, and time. No sensory deficit.   Skin: Skin is warm and dry. Rash (round, raised, and mildly erythematous on border lesion on RLE) noted. She is not diaphoretic. No cyanosis. Nails show no clubbing.   Psychiatric: She has a normal mood and affect. Her behavior is normal. Judgment normal.       Assessment:     1. Annual physical exam    2. Vitamin D insufficiency    3. Irritable bowel syndrome, unspecified type    4. Tinea corporis           Plan:         1. Annual physical exam  - labs reviewed w/ pt  - immunizations utd  - mammo utd, breast exam today  - c-scope utd    2. Vitamin D insufficiency  - cholecalciferol, vitamin D3, (VITAMIN D3) 25 mcg (1,000 unit) capsule; Take 2 capsules (2,000 Units total) by mouth once daily.; Refill: 0    3. Irritable bowel syndrome, unspecified type  - dicyclomine (BENTYL) 10 MG capsule; Take 1 capsule (10 mg total) by mouth 3 (three) times daily as needed (abdominal pain).  Dispense: 60 capsule; Refill: 2    4. Tinea corporis  - clotrimazole-betamethasone 1-0.05% (LOTRISONE) cream; Apply topically 2 (two) times daily.  Dispense: 15 g; Refill: 1           Patient note was created using [employer] Dictation.  Any errors in syntax or even information may not have been identified and edited on initial review prior to signing this note.

## 2020-03-06 ENCOUNTER — PATIENT OUTREACH (OUTPATIENT)
Dept: ADMINISTRATIVE | Facility: HOSPITAL | Age: 65
End: 2020-03-06

## 2020-07-02 ENCOUNTER — LAB VISIT (OUTPATIENT)
Dept: PRIMARY CARE CLINIC | Facility: OTHER | Age: 65
End: 2020-07-02
Attending: INTERNAL MEDICINE
Payer: MEDICARE

## 2020-07-02 DIAGNOSIS — Z03.818 ENCOUNTER FOR OBSERVATION FOR SUSPECTED EXPOSURE TO OTHER BIOLOGICAL AGENTS RULED OUT: ICD-10-CM

## 2020-07-02 PROCEDURE — U0003 INFECTIOUS AGENT DETECTION BY NUCLEIC ACID (DNA OR RNA); SEVERE ACUTE RESPIRATORY SYNDROME CORONAVIRUS 2 (SARS-COV-2) (CORONAVIRUS DISEASE [COVID-19]), AMPLIFIED PROBE TECHNIQUE, MAKING USE OF HIGH THROUGHPUT TECHNOLOGIES AS DESCRIBED BY CMS-2020-01-R: HCPCS | Mod: ST72

## 2020-07-06 ENCOUNTER — TELEPHONE (OUTPATIENT)
Dept: INTERNAL MEDICINE | Facility: CLINIC | Age: 65
End: 2020-07-06

## 2020-07-06 DIAGNOSIS — U07.1 COVID-19 VIRUS DETECTED: ICD-10-CM

## 2020-07-06 LAB — SARS-COV-2 RNA RESP QL NAA+PROBE: POSITIVE

## 2020-07-06 NOTE — TELEPHONE ENCOUNTER
MD spoke w/ pt who states that she and  are aware they are covid19+. Pt did report when she takes a deep breath it is hard. States she does not feel sob. Sx ongoing x 1 week. I told pt that she should go to ER if having trouble breathing. Pt states that she does not feel that its sob at this time. Will consider if sx worsen.

## 2020-07-08 ENCOUNTER — NURSE TRIAGE (OUTPATIENT)
Dept: ADMINISTRATIVE | Facility: CLINIC | Age: 65
End: 2020-07-08

## 2020-07-08 NOTE — TELEPHONE ENCOUNTER
"Patient auto populated to Covid 19 monitoring system que..  Spoke with  Beto who actually replied yes to text message.  Patient stated both he and his wife test positive on July 2.  Patient  Had just general questions on how to treat and manage symptoms for next 14 days..  Beto stated wife has been having low grade temp and had chest "soreness" and just feels tired,  Informed patient of OTC medications for symptoms.  Patient has PCP if symptoms worsen and OOC phone number given   aware top go to ED if symptoms worsen or call 911  "

## 2020-07-08 NOTE — TELEPHONE ENCOUNTER
Reason for Disposition   [1] COVID-19 diagnosed by HCP (doctor, NP or PA) AND [2] mild symptoms (e.g., cough, fever, others) AND [3] no complications or SOB   COVID-19 Home Isolation, questions about    Additional Information   Negative: SEVERE difficulty breathing (e.g., struggling for each breath, speaks in single words)   Negative: Difficult to awaken or acting confused (e.g., disoriented, slurred speech)   Negative: Bluish (or gray) lips or face now   Negative: Shock suspected (e.g., cold/pale/clammy skin, too weak to stand, low BP, rapid pulse)   Negative: Sounds like a life-threatening emergency to the triager   Negative: [1] COVID-19 exposure AND [2] NO symptoms   Negative: COVID-19 and Breastfeeding, questions about   Negative: [1] Adult with possible COVID-19 symptoms AND [2] triager concerned about severity of symptoms or other causes   Negative: Patient sounds very sick or weak to the triager   Negative: SEVERE or constant chest pain or pressure (Exception: mild central chest pain, present only when coughing)   Negative: MODERATE difficulty breathing (e.g., speaks in phrases, SOB even at rest, pulse 100-120)   Negative: MILD difficulty breathing (e.g., minimal/no SOB at rest, SOB with walking, pulse <100)   Negative: Chest pain or pressure   Negative: Fever > 103 F (39.4 C)   Negative: [1] Fever > 101 F (38.3 C) AND [2] age > 60   Negative: [1] Fever > 100.0 F (37.8 C) AND [2] bedridden (e.g., nursing home patient, CVA, chronic illness, recovering from surgery)   Negative: HIGH RISK patient (e.g., age > 64 years, diabetes, heart or lung disease, weak immune system)   Negative: Fever present > 3 days (72 hours)   Negative: [1] Fever returns after gone for over 24 hours AND [2] symptoms worse or not improved   Negative: [1] Continuous (nonstop) coughing interferes with work or school AND [2] no improvement using cough treatment per protocol   Negative: [1] COVID-19 infection  suspected by caller or triager AND [2] mild symptoms (cough, fever, or others) AND [3] no complications or SOB   Negative: Cough present > 3 weeks    Protocols used: CORONAVIRUS (COVID-19) DIAGNOSED OR [id number]

## 2020-07-09 ENCOUNTER — NURSE TRIAGE (OUTPATIENT)
Dept: ADMINISTRATIVE | Facility: CLINIC | Age: 65
End: 2020-07-09

## 2020-07-09 NOTE — TELEPHONE ENCOUNTER
Spoke with patient on behalf of [facility] Home Symptom Monitoring. Denies SOB or CP. Complains of weakness and body aches and states that fever went away two days ago. Asking questions about if patients can get antibiotics and hydroxychloraquine for this. Home care advice given via Covid Diagnosed and Suspected protocol regarding OTC medications and interventions that can be taken and educated on the usage of antibiotics for viruses. Pt instructed to call back and escalate if symptoms worsen. Pt accepted all instructions.    Reason for Disposition   [1] COVID-19 diagnosed by positive lab test AND [2] mild symptoms (e.g., cough, fever, others) AND [3] no complications or SOB    Additional Information   Negative: SEVERE difficulty breathing (e.g., struggling for each breath, speaks in single words)   Negative: Difficult to awaken or acting confused (e.g., disoriented, slurred speech)   Negative: Bluish (or gray) lips or face now   Negative: Shock suspected (e.g., cold/pale/clammy skin, too weak to stand, low BP, rapid pulse)   Negative: Sounds like a life-threatening emergency to the triager   Negative: [1] COVID-19 exposure AND [2] NO symptoms   Negative: COVID-19 and Breastfeeding, questions about   Negative: [1] Adult with possible COVID-19 symptoms AND [2] triager concerned about severity of symptoms or other causes   Negative: SEVERE or constant chest pain or pressure (Exception: mild central chest pain, present only when coughing)   Negative: MODERATE difficulty breathing (e.g., speaks in phrases, SOB even at rest, pulse 100-120)   Negative: MILD difficulty breathing (e.g., minimal/no SOB at rest, SOB with walking, pulse <100)   Negative: Chest pain   Negative: Patient sounds very sick or weak to the triager   Negative: Fever > 103 F (39.4 C)   Negative: [1] Fever > 101 F (38.3 C) AND [2] age > 60   Negative: [1] Fever > 100.0 F (37.8 C) AND [2] bedridden (e.g., nursing home patient, CVA, chronic  illness, recovering from surgery)   Negative: HIGH RISK patient (e.g., age > 64 years, diabetes, heart or lung disease, weak immune system)   Negative: Fever present > 3 days (72 hours)   Negative: [1] Fever returns after gone for over 24 hours AND [2] symptoms worse or not improved   Negative: [1] Continuous (nonstop) coughing interferes with work or school AND [2] no improvement using cough treatment per protocol   Negative: Cough present > 3 weeks   Negative: [1] COVID-19 infection suspected by caller or triager AND [2] mild symptoms (cough, fever, or others) AND [3] no complications or SOB    Protocols used: CORONAVIRUS (COVID-19) DIAGNOSED OR [id number]

## 2020-07-11 ENCOUNTER — PATIENT MESSAGE (OUTPATIENT)
Dept: INTERNAL MEDICINE | Facility: CLINIC | Age: 65
End: 2020-07-11

## 2020-07-13 ENCOUNTER — PATIENT MESSAGE (OUTPATIENT)
Dept: INTERNAL MEDICINE | Facility: CLINIC | Age: 65
End: 2020-07-13

## 2020-07-13 DIAGNOSIS — R51.9 NONINTRACTABLE HEADACHE, UNSPECIFIED CHRONICITY PATTERN, UNSPECIFIED HEADACHE TYPE: Primary | ICD-10-CM

## 2020-07-13 RX ORDER — BUTALBITAL, ACETAMINOPHEN AND CAFFEINE 50; 325; 40 MG/1; MG/1; MG/1
1 TABLET ORAL EVERY 4 HOURS PRN
Qty: 30 TABLET | Refills: 0 | Status: SHIPPED | OUTPATIENT
Start: 2020-07-13 | End: 2020-09-29

## 2020-07-13 NOTE — TELEPHONE ENCOUNTER
There are no FDA approved medications to treat COVID19 in non-hospitalized patients. Management is based on symptoms- adequate hydration, tylenol for fever. She can take tylenol for the body aches and headache. If her symptoms are worsening, recommend ER for further evaluation- check oxygenation, vital signs, labs, etc.       Pt already enrolled in COVID19 home symptom monitoring program.

## 2020-07-14 ENCOUNTER — TELEPHONE (OUTPATIENT)
Dept: INTERNAL MEDICINE | Facility: CLINIC | Age: 65
End: 2020-07-14

## 2020-07-14 ENCOUNTER — PATIENT MESSAGE (OUTPATIENT)
Dept: INTERNAL MEDICINE | Facility: CLINIC | Age: 65
End: 2020-07-14

## 2020-07-14 NOTE — TELEPHONE ENCOUNTER
----- Message from Gloria Stroud sent at 7/14/2020  3:50 PM CDT -----  Regarding: Ms. Rowell Pharmacy Student @ [facility] # [phone], [phone]  Ms. Rowell would like a call back in regards to her wanting to speak with you about an order for the patients butalbital-acetaminophen-caffeine -40 mg (FIORICET, ESGIC) -40 mg per tablet script. She did not explain herself really well so I'm not sure what message she wanted to relate to me?

## 2020-07-17 DIAGNOSIS — Z71.89 COMPLEX CARE COORDINATION: ICD-10-CM

## 2020-07-22 NOTE — TELEPHONE ENCOUNTER
Received denial for fioricet due to no clinical notes on pt's headaches. I sent msg via portal to let pt know

## 2020-07-29 ENCOUNTER — PATIENT MESSAGE (OUTPATIENT)
Dept: INTERNAL MEDICINE | Facility: CLINIC | Age: 65
End: 2020-07-29

## 2020-08-04 ENCOUNTER — PATIENT MESSAGE (OUTPATIENT)
Dept: INTERNAL MEDICINE | Facility: CLINIC | Age: 65
End: 2020-08-04

## 2020-08-05 ENCOUNTER — LAB VISIT (OUTPATIENT)
Dept: LAB | Facility: HOSPITAL | Age: 65
End: 2020-08-05
Attending: INTERNAL MEDICINE
Payer: MEDICARE

## 2020-08-05 ENCOUNTER — OFFICE VISIT (OUTPATIENT)
Dept: INTERNAL MEDICINE | Facility: CLINIC | Age: 65
End: 2020-08-05
Payer: MEDICARE

## 2020-08-05 VITALS
WEIGHT: 168.88 LBS | HEIGHT: 67 IN | DIASTOLIC BLOOD PRESSURE: 60 MMHG | HEART RATE: 76 BPM | TEMPERATURE: 99 F | SYSTOLIC BLOOD PRESSURE: 100 MMHG | BODY MASS INDEX: 26.51 KG/M2

## 2020-08-05 DIAGNOSIS — U07.1 COVID-19 VIRUS INFECTION: ICD-10-CM

## 2020-08-05 DIAGNOSIS — R53.83 FATIGUE, UNSPECIFIED TYPE: ICD-10-CM

## 2020-08-05 DIAGNOSIS — R07.89 CHEST TIGHTNESS: ICD-10-CM

## 2020-08-05 DIAGNOSIS — R06.02 SHORTNESS OF BREATH: Primary | ICD-10-CM

## 2020-08-05 LAB
ALBUMIN SERPL BCP-MCNC: 3.6 G/DL (ref 3.5–5.2)
ALP SERPL-CCNC: 47 U/L (ref 55–135)
ALT SERPL W/O P-5'-P-CCNC: 15 U/L (ref 10–44)
ANION GAP SERPL CALC-SCNC: 7 MMOL/L (ref 8–16)
AST SERPL-CCNC: 17 U/L (ref 10–40)
BASOPHILS # BLD AUTO: 0.07 K/UL (ref 0–0.2)
BASOPHILS NFR BLD: 1.1 % (ref 0–1.9)
BILIRUB SERPL-MCNC: 0.4 MG/DL (ref 0.1–1)
BUN SERPL-MCNC: 13 MG/DL (ref 8–23)
CALCIUM SERPL-MCNC: 9.1 MG/DL (ref 8.7–10.5)
CHLORIDE SERPL-SCNC: 107 MMOL/L (ref 95–110)
CO2 SERPL-SCNC: 27 MMOL/L (ref 23–29)
CREAT SERPL-MCNC: 0.8 MG/DL (ref 0.5–1.4)
DIFFERENTIAL METHOD: ABNORMAL
EOSINOPHIL # BLD AUTO: 0.1 K/UL (ref 0–0.5)
EOSINOPHIL NFR BLD: 2.1 % (ref 0–8)
ERYTHROCYTE [DISTWIDTH] IN BLOOD BY AUTOMATED COUNT: 13.4 % (ref 11.5–14.5)
EST. GFR  (AFRICAN AMERICAN): >60 ML/MIN/1.73 M^2
EST. GFR  (NON AFRICAN AMERICAN): >60 ML/MIN/1.73 M^2
GLUCOSE SERPL-MCNC: 81 MG/DL (ref 70–110)
HCT VFR BLD AUTO: 42.1 % (ref 37–48.5)
HGB BLD-MCNC: 13 G/DL (ref 12–16)
IMM GRANULOCYTES # BLD AUTO: 0.02 K/UL (ref 0–0.04)
IMM GRANULOCYTES NFR BLD AUTO: 0.3 % (ref 0–0.5)
LYMPHOCYTES # BLD AUTO: 1.7 K/UL (ref 1–4.8)
LYMPHOCYTES NFR BLD: 27.3 % (ref 18–48)
MCH RBC QN AUTO: 30.2 PG (ref 27–31)
MCHC RBC AUTO-ENTMCNC: 30.9 G/DL (ref 32–36)
MCV RBC AUTO: 98 FL (ref 82–98)
MONOCYTES # BLD AUTO: 0.6 K/UL (ref 0.3–1)
MONOCYTES NFR BLD: 9.5 % (ref 4–15)
NEUTROPHILS # BLD AUTO: 3.7 K/UL (ref 1.8–7.7)
NEUTROPHILS NFR BLD: 59.7 % (ref 38–73)
NRBC BLD-RTO: 0 /100 WBC
PLATELET # BLD AUTO: 221 K/UL (ref 150–350)
PMV BLD AUTO: 11.4 FL (ref 9.2–12.9)
POTASSIUM SERPL-SCNC: 4.6 MMOL/L (ref 3.5–5.1)
PROT SERPL-MCNC: 6.8 G/DL (ref 6–8.4)
RBC # BLD AUTO: 4.3 M/UL (ref 4–5.4)
SARS-COV-2 IGG SERPLBLD QL IA.RAPID: POSITIVE
SODIUM SERPL-SCNC: 141 MMOL/L (ref 136–145)
TSH SERPL DL<=0.005 MIU/L-ACNC: 0.69 UIU/ML (ref 0.4–4)
WBC # BLD AUTO: 6.23 K/UL (ref 3.9–12.7)

## 2020-08-05 PROCEDURE — 93005 ELECTROCARDIOGRAM TRACING: CPT | Mod: PBBFAC,PO | Performed by: INTERNAL MEDICINE

## 2020-08-05 PROCEDURE — 86769 SARS-COV-2 COVID-19 ANTIBODY: CPT

## 2020-08-05 PROCEDURE — 85025 COMPLETE CBC W/AUTO DIFF WBC: CPT

## 2020-08-05 PROCEDURE — 36415 COLL VENOUS BLD VENIPUNCTURE: CPT | Mod: PO

## 2020-08-05 PROCEDURE — 99214 OFFICE O/P EST MOD 30 MIN: CPT | Mod: S$PBB,,, | Performed by: INTERNAL MEDICINE

## 2020-08-05 PROCEDURE — 93010 EKG 12-LEAD: ICD-10-PCS | Mod: S$PBB,,, | Performed by: INTERNAL MEDICINE

## 2020-08-05 PROCEDURE — 93010 ELECTROCARDIOGRAM REPORT: CPT | Mod: S$PBB,,, | Performed by: INTERNAL MEDICINE

## 2020-08-05 PROCEDURE — 99214 PR OFFICE/OUTPT VISIT, EST, LEVL IV, 30-39 MIN: ICD-10-PCS | Mod: S$PBB,,, | Performed by: INTERNAL MEDICINE

## 2020-08-05 PROCEDURE — 99999 PR PBB SHADOW E&M-EST. PATIENT-LVL IV: CPT | Mod: PBBFAC,,, | Performed by: INTERNAL MEDICINE

## 2020-08-05 PROCEDURE — 99214 OFFICE O/P EST MOD 30 MIN: CPT | Mod: PBBFAC,PO,25 | Performed by: INTERNAL MEDICINE

## 2020-08-05 PROCEDURE — 99999 PR PBB SHADOW E&M-EST. PATIENT-LVL IV: ICD-10-PCS | Mod: PBBFAC,,, | Performed by: INTERNAL MEDICINE

## 2020-08-05 PROCEDURE — 80053 COMPREHEN METABOLIC PANEL: CPT

## 2020-08-05 PROCEDURE — 84443 ASSAY THYROID STIM HORMONE: CPT

## 2020-08-05 NOTE — PROGRESS NOTES
Subjective:       Patient ID: Amira Araujo is a 65 y.o. female.    Chief Complaint: Shortness of Breath and Dizziness    Patient new to me presents for evaluation of several issues that have developed since she was diagnosed with COVID-19 on 7/2/20.  She and her  tested positive.  Her symptoms were fatigue, headache, but no fever/chills, no cough.  She now complains of progressive shortness of breath especially with climbing stairs or walking on hikes.  Some chest tightness, but denies outright pain, no orthopnea, no ankle/foot swelling.  No cough or hemoptysis.  Had headaches with the covid initially which were bad, but these have resolved.      She and her  split a medrol dospak which made them both feel better.  But her fatigue and shortness of breath returned after completing the medrol which the  had on hand for another reason. They apparently discussed use of the medrol with neighbor physician.    No apparent side effect from the medrol, but she thinks it helped.  She is here to see about getting more.    She sees Dr. Reyes at  as her cardiologist.  Has had abnormal EKG in past, but had negative stress test and angiogram in past years - more than 5 years ago.    Review of Systems   Constitutional: Positive for fatigue. Negative for activity change, chills and fever.   HENT: Negative.    Respiratory: Positive for chest tightness and shortness of breath. Negative for cough.    Cardiovascular: Negative for chest pain, palpitations, leg swelling and claudication.   Gastrointestinal: Negative.    Genitourinary: Negative.    Neurological: Positive for light-headedness and headaches. Negative for dizziness, vertigo and syncope.   Psychiatric/Behavioral: Negative for sleep disturbance.         Objective:      Physical Exam  Vitals signs reviewed. Exam conducted with a chaperone present.   Constitutional:       Appearance: Normal appearance.   HENT:      Head: Normocephalic.      Right Ear:  Tympanic membrane, ear canal and external ear normal. There is no impacted cerumen.      Left Ear: Tympanic membrane, ear canal and external ear normal. There is no impacted cerumen.      Nose: Nose normal.      Mouth/Throat:      Mouth: Mucous membranes are moist.      Pharynx: No oropharyngeal exudate.   Eyes:      General: No scleral icterus.     Extraocular Movements: Extraocular movements intact.      Conjunctiva/sclera: Conjunctivae normal.      Pupils: Pupils are equal, round, and reactive to light.      Comments: Pronounced asymmetry of eyes with large right eye, retracted upper eyelid and droopy upper eyelid on left.  She states this is from prior eyelid surgery.  Not new for her.   Neck:      Musculoskeletal: Normal range of motion and neck supple.      Vascular: No carotid bruit.   Cardiovascular:      Rate and Rhythm: Normal rate and regular rhythm.      Pulses: Normal pulses.      Heart sounds: Normal heart sounds. No murmur. No gallop.    Pulmonary:      Effort: Pulmonary effort is normal. No respiratory distress.      Breath sounds: Normal breath sounds.   Abdominal:      General: Abdomen is flat. Bowel sounds are normal. There is no distension.      Palpations: Abdomen is soft.      Tenderness: There is no abdominal tenderness.   Musculoskeletal: Normal range of motion.   Skin:     General: Skin is warm.      Findings: No rash.   Neurological:      General: No focal deficit present.      Mental Status: She is alert and oriented to person, place, and time.      Cranial Nerves: No cranial nerve deficit.      Deep Tendon Reflexes: Reflexes normal.   Psychiatric:         Mood and Affect: Mood normal.         Behavior: Behavior normal.         Thought Content: Thought content normal.         Judgment: Judgment normal.         Assessment:       1. Shortness of breath    2. COVID-19 virus infection    3. Fatigue, unspecified type    4. Chest tightness        Plan:   Pulse ox in office at rest = 97%.  After  ambulating in hallway = 97%.  EKG done in office and reviewed with patient shows non-specific ST-T changes which I do not know if these are new as no prior EKG here.  She doesn't know what the EKG showed in the past.  :  After lengthy discussion will :  1. Check CBC, CMP, TSH and covid antibody.  2. She will consult with Dr Grayto about the EKG - given copy to bring to Dr Reyes.  3. email results.  4. Depending on labs and Dr. Reyes may given short course of steroids.

## 2020-08-05 NOTE — PATIENT INSTRUCTIONS
1. Bring copy of EKG to your cardiologist for comparison.  2. Will check labs for CBC, CMP, covid antibody.  Email results.  3. Depending on Dr. Reyes's thoughts about EKG and labs, may consider short course of steroid.

## 2020-08-07 ENCOUNTER — PATIENT MESSAGE (OUTPATIENT)
Dept: INTERNAL MEDICINE | Facility: CLINIC | Age: 65
End: 2020-08-07

## 2020-09-01 ENCOUNTER — TELEPHONE (OUTPATIENT)
Dept: OBSTETRICS AND GYNECOLOGY | Facility: CLINIC | Age: 65
End: 2020-09-01

## 2020-09-01 DIAGNOSIS — N95.0 PMB (POSTMENOPAUSAL BLEEDING): Primary | ICD-10-CM

## 2020-09-02 ENCOUNTER — TELEPHONE (OUTPATIENT)
Dept: RESEARCH | Facility: HOSPITAL | Age: 65
End: 2020-09-02

## 2020-09-02 NOTE — TELEPHONE ENCOUNTER
"Pt had "slight" bloody discharge x2 days but no discharge today.  Not on HRT. Denies vaginal irritation, itching, discharge, odor and dryness.  Scheduled US and appt with Dr. Rodriguez.    "

## 2020-09-29 ENCOUNTER — OFFICE VISIT (OUTPATIENT)
Dept: OBSTETRICS AND GYNECOLOGY | Facility: CLINIC | Age: 65
End: 2020-09-29
Attending: OBSTETRICS & GYNECOLOGY
Payer: MEDICARE

## 2020-09-29 ENCOUNTER — APPOINTMENT (OUTPATIENT)
Dept: RADIOLOGY | Facility: CLINIC | Age: 65
End: 2020-09-29
Attending: OBSTETRICS & GYNECOLOGY
Payer: MEDICARE

## 2020-09-29 VITALS
DIASTOLIC BLOOD PRESSURE: 68 MMHG | HEIGHT: 67 IN | SYSTOLIC BLOOD PRESSURE: 106 MMHG | WEIGHT: 160.94 LBS | BODY MASS INDEX: 25.26 KG/M2

## 2020-09-29 DIAGNOSIS — N95.0 PMB (POSTMENOPAUSAL BLEEDING): ICD-10-CM

## 2020-09-29 DIAGNOSIS — Z12.31 BREAST CANCER SCREENING BY MAMMOGRAM: Primary | ICD-10-CM

## 2020-09-29 PROCEDURE — 88305 TISSUE EXAM BY PATHOLOGIST: ICD-10-PCS | Mod: 26,,, | Performed by: PATHOLOGY

## 2020-09-29 PROCEDURE — 76830 TRANSVAGINAL US NON-OB: CPT | Mod: 26,,, | Performed by: RADIOLOGY

## 2020-09-29 PROCEDURE — 99213 OFFICE O/P EST LOW 20 MIN: CPT | Mod: 25,S$PBB,, | Performed by: OBSTETRICS & GYNECOLOGY

## 2020-09-29 PROCEDURE — 88305 TISSUE EXAM BY PATHOLOGIST: CPT | Mod: 26,,, | Performed by: PATHOLOGY

## 2020-09-29 PROCEDURE — 58100 BIOPSY OF UTERUS LINING: CPT | Mod: S$PBB,,, | Performed by: OBSTETRICS & GYNECOLOGY

## 2020-09-29 PROCEDURE — 88305 TISSUE EXAM BY PATHOLOGIST: CPT | Performed by: PATHOLOGY

## 2020-09-29 PROCEDURE — 76856 US EXAM PELVIC COMPLETE: CPT | Mod: 26,,, | Performed by: RADIOLOGY

## 2020-09-29 PROCEDURE — 58100 PR BIOPSY OF UTERUS LINING: ICD-10-PCS | Mod: S$PBB,,, | Performed by: OBSTETRICS & GYNECOLOGY

## 2020-09-29 PROCEDURE — 76830 US PELVIS COMP WITH TRANSVAG NON-OB (XPD): ICD-10-PCS | Mod: 26,,, | Performed by: RADIOLOGY

## 2020-09-29 PROCEDURE — 99999 PR PBB SHADOW E&M-EST. PATIENT-LVL III: CPT | Mod: PBBFAC,,, | Performed by: OBSTETRICS & GYNECOLOGY

## 2020-09-29 PROCEDURE — 99999 PR PBB SHADOW E&M-EST. PATIENT-LVL III: ICD-10-PCS | Mod: PBBFAC,,, | Performed by: OBSTETRICS & GYNECOLOGY

## 2020-09-29 PROCEDURE — 99213 PR OFFICE/OUTPT VISIT, EST, LEVL III, 20-29 MIN: ICD-10-PCS | Mod: 25,S$PBB,, | Performed by: OBSTETRICS & GYNECOLOGY

## 2020-09-29 PROCEDURE — 76856 US PELVIS COMP WITH TRANSVAG NON-OB (XPD): ICD-10-PCS | Mod: 26,,, | Performed by: RADIOLOGY

## 2020-09-29 PROCEDURE — 58100 BIOPSY OF UTERUS LINING: CPT | Mod: PBBFAC,PN | Performed by: OBSTETRICS & GYNECOLOGY

## 2020-09-29 PROCEDURE — 99213 OFFICE O/P EST LOW 20 MIN: CPT | Mod: PBBFAC,PN,25 | Performed by: OBSTETRICS & GYNECOLOGY

## 2020-09-29 RX ORDER — ZOSTER VACCINE RECOMBINANT, ADJUVANTED 50 MCG/0.5
KIT INTRAMUSCULAR
COMMUNITY

## 2020-09-29 NOTE — PROGRESS NOTES
Amira Araujo is a 65 y.o.  presents with complaint of postmenopausal vaginal bleeding for 2 days which was medium and bright red This is the first time it has happened.  She can't remember if it was after sex.  She denies pelvic pain and  denies urinary symptoms.  HRT regimen: none    Ultrasound 2020:  Uterus:   Size: 5.4 cm x 2.7 cm x 3.2 cm   Masses: None   Endometrium: Abnormal in this post menopausal patient, measuring 6 mm.   Right ovary:   Size: 1.6 cm x 1.6 cm x 0.7 cm   Appearance: Normal   Vascular flow: Normal.   Left ovary:   Size: 1.2 cm x 1.4 cm x 0.9 cm   Appearance: Normal   Vascular Flow: Normal.   Free Fluid: None.   Impression: Thickened endometrium in this patient with postmenopausal bleeding.    Past Medical History:   Diagnosis Date    Atypical ductal hyperplasia, breast     Breast disorder     Ductal carcinoma in situ (DCIS) of right breast 10/2015    - lumpectomy 2015    History of bone density study 2014    osteopenia- mild T-score Hip: -1.37, Spine: 1.36    IBS (irritable bowel syndrome)     Menopause 2001    Osteopenia 2014    Mild      Past Surgical History:   Procedure Laterality Date    BREAST LUMPECTOMY Left 2015    BROW LIFT AND BLEPHAROPLASTY Bilateral     COLONOSCOPY      5mm sessile polyp in sigmoid colon (pathology - no tissue present); diverticulosis, internal hemorrhoids    TUBAL LIGATION  1985    WISDOM TOOTH EXTRACTION       Social History     Tobacco Use    Smoking status: Never Smoker    Smokeless tobacco: Never Used   Substance Use Topics    Alcohol use: Yes     Comment: socially     Drug use: No     Family History   Problem Relation Age of Onset    Lung cancer Father     Prostate cancer Father 73    Heart attack Father 53    Coronary artery disease Father     Hypertension Father     Hyperlipidemia Father     Stroke Mother 75    Breast cancer Sister     Colon cancer Neg Hx     Ovarian cancer Neg Hx   "   Diabetes Neg Hx     Melanoma Neg Hx      OB History    Para Term  AB Living   4 2 2   1 3   SAB TAB Ectopic Multiple Live Births   1       3      # Outcome Date GA Lbr Feliz/2nd Weight Sex Delivery Anes PTL Lv   4 Term 83   3.289 kg (7 lb 4 oz) M Vag-Spont   NII   3  79   3.175 kg (7 lb) F Vag-Spont   NII   2 Term 76   3.062 kg (6 lb 12 oz) F Vag-Spont   NII   1 SAB               Obstetric Comments   Age at menarche 10       Review of Systems:  General: No fever, chills, fatigue or weight loss.  Chest: No chest pain, shortness of breath, or palpitations.  Breast: No pain, masses, or nipple discharge.  Vulva: No pain, lesions, or itching.  Vagina: No relaxation, pain, itching, or lesions.  Abdomen: No pain, nausea, vomiting, diarrhea, or constipation.  Urinary: No incontinence, nocturia, frequency, or dysuria.  Extremities:  No leg cramps, edema, or calf pain.  Neurologic: No headaches, dizziness, or visual changes.    Vitals:  Vitals:    20 1512   BP: 106/68   Weight: 73 kg (160 lb 15 oz)   Height: 5' 7" (1.702 m)   PainSc: 0-No pain     Body mass index is 25.21 kg/m².    Physical exam:   External genitalia:  No lesions, erythema, rashes, or other abnormalities.  Urethra:  No lesions or discharge.  Vagina:  No lesions, discharge, or tenderness.   Cervix:  No lesions, discharge, or cervical motion tenderness.   Uterus:  Normal in size and shape.  Mobile and nontender.   Adnexa:  No masses or tenderness.    Assessment and Plan:  Breast cancer screening by mammogram  -     Mammo Digital Screening Bilat w/ Claude; Future; Expected date: 2020    PMB (postmenopausal bleeding)        Pelvic ultrasound ordered and discussed with pt.  The endometrial stripe is 6 mm  Options of endometrial biopsy versus hysteroscopy, D&C were discussed.  The risks/benefits, and alternatives were discussed.  The patient desires EMB    FOLLOW UP: PRN lack of improvement.  I spent 15 minutes " face to face with the patient today.

## 2020-09-29 NOTE — PROCEDURES
Procedures   Procedure:  Endometrial biopsy    Diagnosis: Postmenopausal bleeding    The risks, benefits, and alternatives were discussed prior to the procedure. Patient signed consents.    Time out:  yes    Procedure note:  The speculum was placed and betadine was applied to the cervix.  The anterior lip of the cervix was grasped with a single tooth tenaculum.  The endometrial pipelle was passed and a minimal amount of tissue was obtained after 2 passes.  Minimal tissue obtained.  The instruments were removed. Silver nitrate was applied for hemostasis.  The specimen was sent to pathology for evaluation.  There were no complications.    Assessment and Plan:  Breast cancer screening by mammogram  -     Mammo Digital Screening Bilkrzysztof w/ Claude; Future; Expected date: 09/29/2020    PMB (postmenopausal bleeding)      Ibuprofen 600 mg every 6 hours prn cramping.  Call the office for heavy vaginal bleeding, fever, nausea, vomiting, or severe lower abdominal pain.

## 2020-10-02 LAB
FINAL PATHOLOGIC DIAGNOSIS: NORMAL
GROSS: NORMAL

## 2020-10-31 ENCOUNTER — EXTERNAL CHRONIC CARE MANAGEMENT (OUTPATIENT)
Dept: PRIMARY CARE CLINIC | Facility: CLINIC | Age: 65
End: 2020-10-31
Payer: MEDICARE

## 2020-10-31 PROCEDURE — 99490 PR CHRONIC CARE MGMT, 1ST 20 MIN: ICD-10-PCS | Mod: S$PBB,,, | Performed by: INTERNAL MEDICINE

## 2020-10-31 PROCEDURE — 99490 CHRNC CARE MGMT STAFF 1ST 20: CPT | Mod: S$PBB,,, | Performed by: INTERNAL MEDICINE

## 2020-10-31 PROCEDURE — 99490 CHRNC CARE MGMT STAFF 1ST 20: CPT | Mod: PBBFAC,PO | Performed by: INTERNAL MEDICINE

## 2020-11-10 ENCOUNTER — TELEPHONE (OUTPATIENT)
Dept: OBSTETRICS AND GYNECOLOGY | Facility: CLINIC | Age: 65
End: 2020-11-10

## 2020-11-11 NOTE — TELEPHONE ENCOUNTER
Pt was called and notified that dr pike sent her a portal message:      Written by Tatyana Pike MD on 10/3/2020 10:19 PM    Hi Gaby - Your biopsy is benign.

## 2020-11-30 ENCOUNTER — EXTERNAL CHRONIC CARE MANAGEMENT (OUTPATIENT)
Dept: PRIMARY CARE CLINIC | Facility: CLINIC | Age: 65
End: 2020-11-30
Payer: MEDICARE

## 2020-11-30 PROCEDURE — 99490 CHRNC CARE MGMT STAFF 1ST 20: CPT | Mod: S$PBB,,, | Performed by: INTERNAL MEDICINE

## 2020-11-30 PROCEDURE — 99490 PR CHRONIC CARE MGMT, 1ST 20 MIN: ICD-10-PCS | Mod: S$PBB,,, | Performed by: INTERNAL MEDICINE

## 2020-11-30 PROCEDURE — 99490 CHRNC CARE MGMT STAFF 1ST 20: CPT | Mod: PBBFAC,PO | Performed by: INTERNAL MEDICINE

## 2020-12-31 ENCOUNTER — EXTERNAL CHRONIC CARE MANAGEMENT (OUTPATIENT)
Dept: PRIMARY CARE CLINIC | Facility: CLINIC | Age: 65
End: 2020-12-31
Payer: MEDICARE

## 2020-12-31 PROCEDURE — 99490 PR CHRONIC CARE MGMT, 1ST 20 MIN: ICD-10-PCS | Mod: S$PBB,,, | Performed by: INTERNAL MEDICINE

## 2020-12-31 PROCEDURE — 99490 CHRNC CARE MGMT STAFF 1ST 20: CPT | Mod: PBBFAC,PO | Performed by: INTERNAL MEDICINE

## 2020-12-31 PROCEDURE — 99490 CHRNC CARE MGMT STAFF 1ST 20: CPT | Mod: S$PBB,,, | Performed by: INTERNAL MEDICINE

## 2021-01-20 ENCOUNTER — OFFICE VISIT (OUTPATIENT)
Dept: OBSTETRICS AND GYNECOLOGY | Facility: CLINIC | Age: 66
End: 2021-01-20
Attending: OBSTETRICS & GYNECOLOGY
Payer: MEDICARE

## 2021-01-20 ENCOUNTER — APPOINTMENT (OUTPATIENT)
Dept: RADIOLOGY | Facility: OTHER | Age: 66
End: 2021-01-20
Attending: OBSTETRICS & GYNECOLOGY
Payer: MEDICARE

## 2021-01-20 VITALS
BODY MASS INDEX: 24.92 KG/M2 | HEIGHT: 67 IN | WEIGHT: 158.75 LBS | SYSTOLIC BLOOD PRESSURE: 112 MMHG | DIASTOLIC BLOOD PRESSURE: 70 MMHG

## 2021-01-20 DIAGNOSIS — Z12.31 BREAST CANCER SCREENING BY MAMMOGRAM: ICD-10-CM

## 2021-01-20 DIAGNOSIS — Z11.51 SCREENING FOR HUMAN PAPILLOMAVIRUS: ICD-10-CM

## 2021-01-20 DIAGNOSIS — Z12.4 SCREENING FOR MALIGNANT NEOPLASM OF CERVIX: Primary | ICD-10-CM

## 2021-01-20 DIAGNOSIS — Z01.419 ENCOUNTER FOR GYNECOLOGICAL EXAMINATION: ICD-10-CM

## 2021-01-20 PROCEDURE — G0101 CA SCREEN;PELVIC/BREAST EXAM: HCPCS | Mod: GZ,S$PBB,, | Performed by: OBSTETRICS & GYNECOLOGY

## 2021-01-20 PROCEDURE — 87624 HPV HI-RISK TYP POOLED RSLT: CPT

## 2021-01-20 PROCEDURE — 99999 PR PBB SHADOW E&M-EST. PATIENT-LVL III: ICD-10-PCS | Mod: PBBFAC,,, | Performed by: OBSTETRICS & GYNECOLOGY

## 2021-01-20 PROCEDURE — 77067 MAMMO DIGITAL SCREENING BILAT WITH TOMO: ICD-10-PCS | Mod: 26,,, | Performed by: RADIOLOGY

## 2021-01-20 PROCEDURE — 77067 SCR MAMMO BI INCL CAD: CPT | Mod: TC,PN

## 2021-01-20 PROCEDURE — 99999 PR PBB SHADOW E&M-EST. PATIENT-LVL III: CPT | Mod: PBBFAC,,, | Performed by: OBSTETRICS & GYNECOLOGY

## 2021-01-20 PROCEDURE — 88175 CYTOPATH C/V AUTO FLUID REDO: CPT

## 2021-01-20 PROCEDURE — G0101 PR CA SCREEN;PELVIC/BREAST EXAM: ICD-10-PCS | Mod: GZ,S$PBB,, | Performed by: OBSTETRICS & GYNECOLOGY

## 2021-01-20 PROCEDURE — 99213 OFFICE O/P EST LOW 20 MIN: CPT | Mod: PBBFAC,PN | Performed by: OBSTETRICS & GYNECOLOGY

## 2021-01-20 PROCEDURE — 77063 MAMMO DIGITAL SCREENING BILAT WITH TOMO: ICD-10-PCS | Mod: 26,,, | Performed by: RADIOLOGY

## 2021-01-20 PROCEDURE — 77063 BREAST TOMOSYNTHESIS BI: CPT | Mod: 26,,, | Performed by: RADIOLOGY

## 2021-01-20 PROCEDURE — 77067 SCR MAMMO BI INCL CAD: CPT | Mod: 26,,, | Performed by: RADIOLOGY

## 2021-01-20 RX ORDER — DICYCLOMINE HYDROCHLORIDE 20 MG/1
TABLET ORAL
COMMUNITY
Start: 2020-11-06

## 2021-01-20 RX ORDER — TOPIRAMATE 50 MG/1
TABLET, FILM COATED ORAL
COMMUNITY
Start: 2021-01-08 | End: 2022-03-08

## 2021-01-20 RX ORDER — PHENTERMINE HYDROCHLORIDE 37.5 MG/1
TABLET ORAL
COMMUNITY
Start: 2021-01-08

## 2021-01-20 RX ORDER — PANTOPRAZOLE SODIUM 40 MG/1
TABLET, DELAYED RELEASE ORAL
COMMUNITY
Start: 2020-11-06

## 2021-01-28 LAB
HPV HR 12 DNA SPEC QL NAA+PROBE: NEGATIVE
HPV16 AG SPEC QL: NEGATIVE
HPV18 DNA SPEC QL NAA+PROBE: NEGATIVE

## 2021-01-31 ENCOUNTER — EXTERNAL CHRONIC CARE MANAGEMENT (OUTPATIENT)
Dept: PRIMARY CARE CLINIC | Facility: CLINIC | Age: 66
End: 2021-01-31
Payer: MEDICARE

## 2021-01-31 PROCEDURE — 99490 CHRNC CARE MGMT STAFF 1ST 20: CPT | Mod: S$PBB,,, | Performed by: INTERNAL MEDICINE

## 2021-01-31 PROCEDURE — 99490 CHRNC CARE MGMT STAFF 1ST 20: CPT | Mod: PBBFAC,PO | Performed by: INTERNAL MEDICINE

## 2021-01-31 PROCEDURE — 99490 PR CHRONIC CARE MGMT, 1ST 20 MIN: ICD-10-PCS | Mod: S$PBB,,, | Performed by: INTERNAL MEDICINE

## 2021-02-14 LAB
FINAL PATHOLOGIC DIAGNOSIS: NORMAL
Lab: NORMAL

## 2021-02-28 ENCOUNTER — EXTERNAL CHRONIC CARE MANAGEMENT (OUTPATIENT)
Dept: PRIMARY CARE CLINIC | Facility: CLINIC | Age: 66
End: 2021-02-28
Payer: MEDICARE

## 2021-02-28 PROCEDURE — 99490 CHRNC CARE MGMT STAFF 1ST 20: CPT | Mod: S$PBB,,, | Performed by: INTERNAL MEDICINE

## 2021-02-28 PROCEDURE — 99490 CHRNC CARE MGMT STAFF 1ST 20: CPT | Mod: PBBFAC,PO | Performed by: INTERNAL MEDICINE

## 2021-02-28 PROCEDURE — 99490 PR CHRONIC CARE MGMT, 1ST 20 MIN: ICD-10-PCS | Mod: S$PBB,,, | Performed by: INTERNAL MEDICINE

## 2021-06-08 ENCOUNTER — PES CALL (OUTPATIENT)
Dept: ADMINISTRATIVE | Facility: CLINIC | Age: 66
End: 2021-06-08

## 2021-09-20 ENCOUNTER — PES CALL (OUTPATIENT)
Dept: ADMINISTRATIVE | Facility: CLINIC | Age: 66
End: 2021-09-20

## 2021-10-04 ENCOUNTER — PATIENT MESSAGE (OUTPATIENT)
Dept: ADMINISTRATIVE | Facility: HOSPITAL | Age: 66
End: 2021-10-04

## 2022-01-18 ENCOUNTER — PATIENT MESSAGE (OUTPATIENT)
Dept: ADMINISTRATIVE | Facility: HOSPITAL | Age: 67
End: 2022-01-18
Payer: MEDICARE

## 2022-02-16 ENCOUNTER — PATIENT MESSAGE (OUTPATIENT)
Dept: RESEARCH | Facility: HOSPITAL | Age: 67
End: 2022-02-16
Payer: MEDICARE

## 2022-03-08 ENCOUNTER — OFFICE VISIT (OUTPATIENT)
Dept: OBSTETRICS AND GYNECOLOGY | Facility: CLINIC | Age: 67
End: 2022-03-08
Attending: OBSTETRICS & GYNECOLOGY
Payer: MEDICARE

## 2022-03-08 ENCOUNTER — LAB VISIT (OUTPATIENT)
Dept: LAB | Facility: HOSPITAL | Age: 67
End: 2022-03-08
Attending: OBSTETRICS & GYNECOLOGY
Payer: MEDICARE

## 2022-03-08 VITALS
SYSTOLIC BLOOD PRESSURE: 110 MMHG | DIASTOLIC BLOOD PRESSURE: 72 MMHG | BODY MASS INDEX: 26.64 KG/M2 | HEIGHT: 67 IN | WEIGHT: 169.75 LBS

## 2022-03-08 DIAGNOSIS — Z13.21 ENCOUNTER FOR VITAMIN DEFICIENCY SCREENING: ICD-10-CM

## 2022-03-08 DIAGNOSIS — Z01.419 ENCOUNTER FOR GYNECOLOGICAL EXAMINATION: ICD-10-CM

## 2022-03-08 DIAGNOSIS — Z12.31 BREAST CANCER SCREENING BY MAMMOGRAM: Primary | ICD-10-CM

## 2022-03-08 DIAGNOSIS — E55.9 VITAMIN D INSUFFICIENCY: ICD-10-CM

## 2022-03-08 LAB — 25(OH)D3+25(OH)D2 SERPL-MCNC: 25 NG/ML (ref 30–96)

## 2022-03-08 PROCEDURE — G0101 PR CA SCREEN;PELVIC/BREAST EXAM: ICD-10-PCS | Mod: S$PBB,,, | Performed by: OBSTETRICS & GYNECOLOGY

## 2022-03-08 PROCEDURE — G0101 CA SCREEN;PELVIC/BREAST EXAM: HCPCS | Mod: S$PBB,,, | Performed by: OBSTETRICS & GYNECOLOGY

## 2022-03-08 PROCEDURE — 99999 PR PBB SHADOW E&M-EST. PATIENT-LVL III: ICD-10-PCS | Mod: PBBFAC,,, | Performed by: OBSTETRICS & GYNECOLOGY

## 2022-03-08 PROCEDURE — 99999 PR PBB SHADOW E&M-EST. PATIENT-LVL III: CPT | Mod: PBBFAC,,, | Performed by: OBSTETRICS & GYNECOLOGY

## 2022-03-08 PROCEDURE — 82306 VITAMIN D 25 HYDROXY: CPT | Performed by: OBSTETRICS & GYNECOLOGY

## 2022-03-08 PROCEDURE — G0101 CA SCREEN;PELVIC/BREAST EXAM: HCPCS | Mod: PBBFAC,PN | Performed by: OBSTETRICS & GYNECOLOGY

## 2022-03-08 PROCEDURE — 99213 OFFICE O/P EST LOW 20 MIN: CPT | Mod: PBBFAC,PN,25 | Performed by: OBSTETRICS & GYNECOLOGY

## 2022-03-08 RX ORDER — CELECOXIB 200 MG/1
CAPSULE ORAL
COMMUNITY
Start: 2022-02-07

## 2022-03-08 NOTE — PROGRESS NOTES
Subjective:       Patient ID: Amira Araujo is a 66 y.o. female.    Chief Complaint:  Well Woman (Pap/hpv 21, neg  --  mmg 21, birads 1  --  dexa 12-3-18, normal  --  Cscope 2021, Normal (10 yrs) )      History of Present Illness.  Amira Araujo is a 66 y.o. female.  She has no breast or urinary symptoms.  She has no postcoital bleeding, pelvic pain or vaginal discharge.      GYN & OB History  No LMP recorded. Patient is postmenopausal.   Pap: 2021 Normal HPV negative  Mammogram: 21 Birads  Colonoscopy: 2021 Normal  DEXA: 12/3/18 Normal  PCP:  Dr. Shepherd  Routine Labs:      OB History    Para Term  AB Living   4 3 3   1 3   SAB IAB Ectopic Multiple Live Births   1       3      # Outcome Date GA Lbr Feliz/2nd Weight Sex Delivery Anes PTL Lv   4 Term 83 40w0d  3.289 kg (7 lb 4 oz) M Vag-Spont   NII   3 Term 79 40w0d  3.175 kg (7 lb) F Vag-Spont   NII   2 Term 76 40w0d  3.062 kg (6 lb 12 oz) F Vag-Spont   NII   1 SAB               Obstetric Comments   menarche 10       Past Medical History:   Diagnosis Date    Atypical ductal hyperplasia, breast     Breast disorder     Ductal carcinoma in situ (DCIS) of right breast 10/2015    - lumpectomy 2015    History of bone density study 2014    osteopenia- mild T-score Hip: -1.37, Spine: 1.36    IBS (irritable bowel syndrome)     Menopause     Osteopenia 2014    Mild      Past Surgical History:   Procedure Laterality Date    BREAST LUMPECTOMY Left     2015    BROW LIFT AND BLEPHAROPLASTY Bilateral     COLONOSCOPY  2021    Normal (Rtn 10 yrs)  2014:  5mm sessile polyp in sigmoid colon (pathology - no tissue present); diverticulosis, internal hemorrhoids    TOTAL KNEE REPLACEMENT USING COMPUTER NAVIGATION Left 2022    TUBAL LIGATION  1985    WISDOM TOOTH EXTRACTION       Family History   Problem Relation Age of Onset    Lung cancer Father     Prostate cancer  Father 73    Heart attack Father 53    Coronary artery disease Father     Hypertension Father     Hyperlipidemia Father     Stroke Mother 75    Breast cancer Sister 67    Colon cancer Neg Hx     Ovarian cancer Neg Hx     Diabetes Neg Hx     Melanoma Neg Hx      Social History     Tobacco Use    Smoking status: Never Smoker    Smokeless tobacco: Never Used   Substance Use Topics    Alcohol use: Yes     Comment: socially     Drug use: No       Current Outpatient Medications:     celecoxib (CELEBREX) 200 MG capsule, TAKE ONE CAPSULE BY MOUTH TWICE DAILY WITH A MEAL, Disp: , Rfl:     cholecalciferol, vitamin D3, (VITAMIN D3) 25 mcg (1,000 unit) capsule, Take 2 capsules (2,000 Units total) by mouth once daily., Disp: , Rfl: 0    dicyclomine (BENTYL) 20 mg tablet, , Disp: , Rfl:     melatonin 1 mg Tab, Take 2 mg by mouth every evening., Disp: , Rfl:     pantoprazole (PROTONIX) 40 MG tablet, , Disp: , Rfl:     varicella-zoster gE-AS01B, PF, (SHINGRIX, PF,) 50 mcg/0.5 mL injection, Shingrix (PF) 50 mcg/0.5 mL intramuscular suspension, kit, Disp: , Rfl:     varicella-zoster gE-AS01B, PF, (SHINGRIX, PF,) 50 mcg/0.5 mL injection, Shingrix (PF) 50 mcg/0.5 mL intramuscular suspension, kit, Disp: , Rfl:     gabapentin (NEURONTIN) 300 MG capsule, Take 1 capsule (300 mg total) by mouth every evening., Disp: 90 capsule, Rfl: 3    phentermine (ADIPEX-P) 37.5 mg tablet, , Disp: , Rfl:     Review of patient's allergies indicates:  No Known Allergies    Review of Systems  Review of Systems   Constitutional: Negative for fatigue.   HENT: Negative for trouble swallowing.    Eyes: Negative for visual disturbance.   Respiratory: Negative for cough and shortness of breath.    Cardiovascular: Negative for chest pain.   Gastrointestinal: Negative for abdominal distention, abdominal pain, blood in stool, nausea and vomiting.   Genitourinary: Negative for difficulty urinating, dyspareunia, dysuria, flank pain, frequency,  "hematuria, pelvic pain, urgency, vaginal bleeding, vaginal discharge and vaginal pain.   Musculoskeletal: Negative for arthralgias.   Skin: Negative for rash.   Neurological: Negative for dizziness and headaches.   Psychiatric/Behavioral: Negative for sleep disturbance. The patient is not nervous/anxious.         Objective:     Vitals:    03/08/22 0851   BP: 110/72   Weight: 77 kg (169 lb 12.1 oz)   Height: 5' 7" (1.702 m)   PainSc: 0-No pain     Body mass index is 26.59 kg/m².    Physical Exam:   Constitutional: She is oriented to person, place, and time. Vital signs are normal. She appears well-developed and well-nourished.    HENT:   Head: Normocephalic.     Neck: No thyromegaly present.     Pulmonary/Chest: Right breast exhibits no mass, no nipple discharge, no skin change, no tenderness and no swelling. Left breast exhibits no mass, no nipple discharge, no skin change, no tenderness and no swelling. Breasts are symmetrical.        Abdominal: Soft. Bowel sounds are normal. She exhibits no distension. There is no abdominal tenderness.     Genitourinary:    Vagina and uterus normal.      Pelvic exam was performed with patient supine.   There is no rash, tenderness, lesion or injury on the right labia. There is no rash, tenderness, lesion or injury on the left labia. Cervix is normal. Right adnexum displays no mass, no tenderness and no fullness. Left adnexum displays no mass, no tenderness and no fullness. No erythema or  no vaginal discharge in the vagina. Cervix exhibits no motion tenderness and no discharge.           Musculoskeletal: Normal range of motion.      Lymphadenopathy:        Right: No supraclavicular adenopathy present.        Left: No supraclavicular adenopathy present.    Neurological: She is alert and oriented to person, place, and time.    Skin: Skin is warm and dry.    Psychiatric: She has a normal mood and affect.        Assessment/ Plan:     Breast cancer screening by mammogram  -     Mammo " Digital Screening Bilat w/ Claude; Future; Expected date: 03/08/2022    Encounter for gynecological examination    Encounter for vitamin deficiency screening  -     Vitamin D; Future; Expected date: 03/08/2022    Vitamin D insufficiency  -     Vitamin D; Future; Expected date: 03/08/2022        Routine pap smears.  Self breast exam and mammography discussed  Routine colonoscopy discussed.  Diet and exercise discussed.  Recommend routine bone mineral density testing.  Yearly influenza vaccination discussed.  Follow-up with me in 1 year

## 2022-03-15 ENCOUNTER — APPOINTMENT (OUTPATIENT)
Dept: RADIOLOGY | Facility: OTHER | Age: 67
End: 2022-03-15
Attending: OBSTETRICS & GYNECOLOGY
Payer: MEDICARE

## 2022-03-15 VITALS — BODY MASS INDEX: 26.64 KG/M2 | HEIGHT: 67 IN | WEIGHT: 169.75 LBS

## 2022-03-15 DIAGNOSIS — Z12.31 BREAST CANCER SCREENING BY MAMMOGRAM: ICD-10-CM

## 2022-03-15 PROCEDURE — 77067 MAMMO DIGITAL SCREENING BILAT WITH TOMO: ICD-10-PCS | Mod: 26,,, | Performed by: RADIOLOGY

## 2022-03-15 PROCEDURE — 77063 MAMMO DIGITAL SCREENING BILAT WITH TOMO: ICD-10-PCS | Mod: 26,,, | Performed by: RADIOLOGY

## 2022-03-15 PROCEDURE — 77067 SCR MAMMO BI INCL CAD: CPT | Mod: 26,,, | Performed by: RADIOLOGY

## 2022-03-15 PROCEDURE — 77063 BREAST TOMOSYNTHESIS BI: CPT | Mod: 26,,, | Performed by: RADIOLOGY

## 2022-03-15 PROCEDURE — 77063 BREAST TOMOSYNTHESIS BI: CPT | Mod: TC,PN

## 2022-03-16 ENCOUNTER — PATIENT MESSAGE (OUTPATIENT)
Dept: ADMINISTRATIVE | Facility: HOSPITAL | Age: 67
End: 2022-03-16
Payer: MEDICARE

## 2022-05-31 ENCOUNTER — PES CALL (OUTPATIENT)
Dept: PRIMARY CARE CLINIC | Facility: CLINIC | Age: 67
End: 2022-05-31
Payer: MEDICARE

## 2022-12-15 ENCOUNTER — PES CALL (OUTPATIENT)
Dept: ADMINISTRATIVE | Facility: CLINIC | Age: 67
End: 2022-12-15
Payer: MEDICARE
